# Patient Record
Sex: MALE | Race: WHITE | NOT HISPANIC OR LATINO | Employment: OTHER | URBAN - METROPOLITAN AREA
[De-identification: names, ages, dates, MRNs, and addresses within clinical notes are randomized per-mention and may not be internally consistent; named-entity substitution may affect disease eponyms.]

---

## 2023-05-15 ENCOUNTER — TRANSFERRED RECORDS (OUTPATIENT)
Dept: HEALTH INFORMATION MANAGEMENT | Facility: CLINIC | Age: 63
End: 2023-05-15

## 2023-05-15 LAB
ALT SERPL-CCNC: 26 U/L
AST SERPL-CCNC: 28 U/L (ref 17–59)
CHOLESTEROL (EXTERNAL): 190 MG/DL (ref 0–200)
CREATININE (EXTERNAL): 0.8 MG/DL (ref 0.7–1.4)
GFR ESTIMATED (EXTERNAL): >60 ML/MIN/1.73M2
GLUCOSE (EXTERNAL): 103 MG/DL (ref 60–99)
HBA1C MFR BLD: 5.28 % (ref 4–5.6)
HDLC SERPL-MCNC: 72 MG/DL (ref 35–65)
LDL CHOLESTEROL CALCULATED (EXTERNAL): 86 MG/DL (ref 0–130)
POTASSIUM (EXTERNAL): 4.1 MMOL/L (ref 3.5–5.1)
TRIGLYCERIDES (EXTERNAL): 161 MG/DL (ref 0–200)
TSH SERPL-ACNC: 4.58 MIU/L (ref 0.47–4.68)

## 2023-09-01 ENCOUNTER — TRANSFERRED RECORDS (OUTPATIENT)
Dept: HEALTH INFORMATION MANAGEMENT | Facility: CLINIC | Age: 63
End: 2023-09-01
Payer: COMMERCIAL

## 2023-09-05 ENCOUNTER — MEDICAL CORRESPONDENCE (OUTPATIENT)
Dept: HEALTH INFORMATION MANAGEMENT | Facility: CLINIC | Age: 63
End: 2023-09-05
Payer: COMMERCIAL

## 2023-09-28 ENCOUNTER — HOSPITAL ENCOUNTER (OUTPATIENT)
Dept: CARDIOLOGY | Facility: CLINIC | Age: 63
Discharge: HOME | End: 2023-09-28
Attending: FAMILY MEDICINE | Admitting: FAMILY MEDICINE
Payer: MEDICARE

## 2023-09-28 DIAGNOSIS — R01.1 SYSTOLIC MURMUR: ICD-10-CM

## 2023-09-28 LAB — LVEF ECHO: NORMAL

## 2023-09-28 PROCEDURE — 93306 TTE W/DOPPLER COMPLETE: CPT | Mod: 26 | Performed by: INTERNAL MEDICINE

## 2023-09-28 PROCEDURE — 93306 TTE W/DOPPLER COMPLETE: CPT

## 2023-10-02 ENCOUNTER — MEDICAL CORRESPONDENCE (OUTPATIENT)
Dept: HEALTH INFORMATION MANAGEMENT | Facility: CLINIC | Age: 63
End: 2023-10-02
Payer: COMMERCIAL

## 2023-10-05 ENCOUNTER — PREP FOR PROCEDURE (OUTPATIENT)
Dept: CARDIOLOGY | Facility: CLINIC | Age: 63
End: 2023-10-05

## 2023-10-05 ENCOUNTER — OFFICE VISIT (OUTPATIENT)
Dept: CARDIOLOGY | Facility: CLINIC | Age: 63
End: 2023-10-05
Payer: COMMERCIAL

## 2023-10-05 ENCOUNTER — TELEPHONE (OUTPATIENT)
Dept: CARDIOLOGY | Facility: CLINIC | Age: 63
End: 2023-10-05

## 2023-10-05 VITALS
DIASTOLIC BLOOD PRESSURE: 60 MMHG | RESPIRATION RATE: 16 BRPM | SYSTOLIC BLOOD PRESSURE: 122 MMHG | HEART RATE: 88 BPM | HEIGHT: 73 IN | BODY MASS INDEX: 24.12 KG/M2 | WEIGHT: 182 LBS

## 2023-10-05 DIAGNOSIS — I77.89 AORTIC ROOT ENLARGEMENT (H): ICD-10-CM

## 2023-10-05 DIAGNOSIS — I35.1 NONRHEUMATIC AORTIC VALVE INSUFFICIENCY: Primary | ICD-10-CM

## 2023-10-05 DIAGNOSIS — I10 PRIMARY HYPERTENSION: ICD-10-CM

## 2023-10-05 DIAGNOSIS — I25.84 CORONARY ARTERY CALCIFICATION OF NATIVE ARTERY: ICD-10-CM

## 2023-10-05 DIAGNOSIS — I25.10 CORONARY ARTERY CALCIFICATION OF NATIVE ARTERY: ICD-10-CM

## 2023-10-05 PROCEDURE — 99204 OFFICE O/P NEW MOD 45 MIN: CPT | Performed by: INTERNAL MEDICINE

## 2023-10-05 RX ORDER — AMLODIPINE BESYLATE 10 MG/1
10 TABLET ORAL DAILY
COMMUNITY
Start: 2023-05-15 | End: 2024-04-03

## 2023-10-05 RX ORDER — MULTIVITAMIN,THERAPEUTIC
1 TABLET ORAL DAILY
COMMUNITY

## 2023-10-05 RX ORDER — FENTANYL CITRATE 50 UG/ML
25 INJECTION, SOLUTION INTRAMUSCULAR; INTRAVENOUS
Status: CANCELLED | OUTPATIENT
Start: 2023-10-09

## 2023-10-05 RX ORDER — LIDOCAINE 40 MG/G
CREAM TOPICAL
Status: CANCELLED | OUTPATIENT
Start: 2023-10-05

## 2023-10-05 RX ORDER — SODIUM CHLORIDE 9 MG/ML
INJECTION, SOLUTION INTRAVENOUS CONTINUOUS
Status: CANCELLED | OUTPATIENT
Start: 2023-10-09

## 2023-10-05 NOTE — LETTER
10/5/2023    Sagar Pereira MD  [facility] [address] [place] [id number]    RE: Kory Guerrero       Dear Colleague,     I had the pleasure of seeing Kory Guerrero in the [facility].      Thank you, Dr. Sagar Pereira, for asking the [facility] team to see Mr. Kory Guerrero to evaluate moderate to severe aortic insufficiency.    Assessment/Recommendations   Assessment:    1.  Moderate to severe aortic insufficiency, likely due to moderate aortic root dilatation as a result of years of poorly controlled hypertension in the past.  He does report symptoms of shortness of breath and fatigue which are common for aortic insufficiency.  His ventricle is beginning to dilate and at this point would recommend consideration of valve surgery along with aortic root repair.  Next step would be to pursue coronary angiography to see if he has any concomitant coronary artery disease that would require bypasses.  Would then refer to CV surgery regarding surgical repair or replacement along with aortic root repair.  2.  Essential hypertension, controlled.  No additional medication needed.  3.  Coronary artery calcification.  Patient has been resistant regarding statin therapy.  He does have very high HDL.  We will hold off until the results of his angiogram.  Did talk about beginning baby aspirin although again he would like to hold off.    Plan:  1.  Continue current medications  2.  We will arrange for coronary angiogram and consultation with CV surgery at [facility].       History of Present Illness    Mr. Kory Guerrero is a 63 year old male with history of essential hypertension which had been poorly controlled for a number of years, coronary artery calcification who presented to his primary care physician for routine physical.  At that visit, he was noted to have a prominent heart murmur and thus an echocardiogram was obtained.  This demonstrated mild left  "ventricular dilatation with normal systolic function but evidence of moderate aortic root enlargement and moderate to severe aortic insufficiency.  He is now here for further recommendation.  Has noted increasing symptoms of fatigue over the past 1 to 2 years.  More recently has noted some shortness of breath.  He notes this most prominently when he lays down at night.  Has not had to increase the number of pillows under his head nor has had any PND.  No family history of aortic disease although his maternal grandmother did have valve replacement surgery of unknown type.    ECG (personally reviewed): No ECG today    Cardiac Imaging Studies (personally reviewed):     Procedure  Complete Echo Adult.  ______________________________________________________________________________  Interpretation Summary     1. The left ventricle is mildly dilated. Left ventricular function is  normal.The ejection fraction is 60-65%.  2. Normal right ventricle size and systolic function.  3. The aortic valve is trileaflet. There is moderately severe (3+) aortic  regurgitation. No aortic stenosis is present.  4. There is moderate aortic root enlargement at 45 mm, The ascending aorta is  Borderline dilated at 40 mm.     There is no comparison study available. Hemodynamically significant valve  disease is identified.        Physical Examination Review of Systems   /60 (BP Location: Left arm, Patient Position: Sitting, Cuff Size: Adult Regular)   Pulse 88   Resp 16   Ht 1.854 m (6' 1\")   Wt 82.6 kg (182 lb)   BMI 24.01 kg/m    Body mass index is 24.01 kg/m .  Wt Readings from Last 3 Encounters:   10/05/23 82.6 kg (182 lb)     General Appearance:   Awake, Alert, No acute distress.   HEENT:  No scleral icterus; the mucous membranes were pink and moist.   Neck: No cervical bruits or jugular venous distention    Chest: The spine was straight. The chest was symmetric.   Lungs:   Respirations unlabored; the lungs are clear to " "auscultation. No wheezing   Cardiovascular:   Regular rate and rhythm.  S1, S2 normal.  3/6 diastolic decrescendo murmur heard best at the right upper sternal border radiating to the left upper sternal border and apex.  Soft systolic flow murmur also heard.   Abdomen:  No organomegaly, masses, bruits, or tenderness. Bowels sounds are present   Extremities: No peripheral edema bilaterally.   Skin: No xanthelasma. Warm, Dry.   Musculoskeletal: No tenderness.   Neurologic: Mood and affect are appropriate.    Enc Vitals  BP: 122/60  Pulse: 88  Resp: 16  Weight: 82.6 kg (182 lb)  Height: 185.4 cm (6' 1\")                                         Medical History  Surgical History Family History Social History   Past Medical History:   Diagnosis Date    Aortic root dilatation (H24)     Aortic valve disorder 09/2023    Moderate to severe AI    Hypertension     No past surgical history on file. Family History   Problem Relation Age of Onset    Cerebrovascular Disease Mother 75        ischemic stroke    Cerebrovascular Disease Father 75        hemorrhagic    Valvular heart disease Maternal Grandmother     Social History     Socioeconomic History    Marital status:      Spouse name: Not on file    Number of children: Not on file    Years of education: Not on file    Highest education level: Not on file   Occupational History    Not on file   Tobacco Use    Smoking status: Never    Smokeless tobacco: Never   Vaping Use    Vaping Use: Never used   Substance and Sexual Activity    Alcohol use: Not on file    Drug use: Never    Sexual activity: Not on file   Other Topics Concern    Not on file   Social History Narrative    Not on file     Social Determinants of Health     Financial Resource Strain: Not on file   Food Insecurity: Not on file   Transportation Needs: Not on file   Physical Activity: Not on file   Stress: Not on file   Social Connections: Not on file   Interpersonal Safety: Not on file   Housing Stability: Not on " file          Medications  Allergies   Current Outpatient Medications   Medication Sig Dispense Refill    amLODIPine (NORVASC) 10 MG tablet Take 10 mg by mouth daily      magnesium oxide 200 MG TABS Take 200 mg by mouth daily      multivitamin, therapeutic (THERA-VIT) TABS tablet Take 1 tablet by mouth daily        No Known Allergies      Lab Results    Chemistry/lipid CBC Cardiac Enzymes/BNP/TSH/INR   No results for input(s): TRIG, CHOLHDL, LDL, CREATININE, BUN, NA, CO2 in the last 75584 hours.    Invalid input(s): TOTALCHOL, LDLCALC, K, CL No results for input(s): WBC, HGB, HCT, MCV, PLT in the last 15906 hours. No results for input(s): CKTOTAL, CKMB, TROPONINI, BNP, TSH, INR in the last 56096 hours.    Invalid input(s): CKMBINDEX     A total of 55 minutes was spent reviewing patient's medical records, obtaining history and performing examination, as well as discussing diagnoses/ recommendations with patient and answering all questions.                        Thank you for allowing me to participate in the care of your patient.      Sincerely,     Judy Cook MD     [facility]  cc:   No referring provider defined for this encounter.

## 2023-10-05 NOTE — TELEPHONE ENCOUNTER
----- Message from Margi Saravia sent at 10/5/2023 11:37 AM CDT -----  Regarding: KML ORDERING  Case type: CORONARY ANGIO AND AORTIC ROOT ANGIO    Procedure Physician(s): IRENE/MANI    Procedure Date and Patient Arrival Time: Monday 10/9, with arrival time of 730 AM    H&P: Completed on 10/5 [facility]    Pre-Procedure Lab Appt: TO BE DONE ON ADMISSION     Alerts/Important Info: None    CT SURG CONSULT SCHDULED 10/31 WITH [facility]    ThanksSandy

## 2023-10-05 NOTE — TELEPHONE ENCOUNTER
Kory Guerrero  [address] [place] [address]  [phone] (home)     Procedure cardiologist:  Dr. Houser or Yash  PCP:  Sagar Pereira  H&JASKARAN completed by:  Dr. Cook 10-5-23  Admit date Monday 10-9-23  Arrival time:  0730  Anticoagulation:  NA  CPAP: No  Previous PCI: No  Bypass Grafts: No  Renal Issues: No  Diabetic?: No  Device?: No  Type:  NA  Ambulation status: independent    Reason for Visit:  Telephone call to discuss pre-procedure education in preparation for: coronary and aortic root  angiogram.    Procedure Prep:  Primary Cardiologist note dated: 10-5-23  EKG results obtained, dated: To be completed on day of cath lab procedure  Hemogram results obtained: To be completed on day of cath lab procedure  Basic Metabolic Panel results obtained: To be completed on day of cath lab procedure  Lipid Profile results obtained: 5-15-23  Pertinent cardiac test results: 5-18-22 CT Ca scan  Orders placed per cosign required protocol 10-5-23.    COVID-19 test: NA    Patient Education  Your arrival time is 0730.  [place] [address] - Main Entrance of the Hospital  Please plan on being at the hospital all day.  At any time, emergencies and/or urgent cases may come up which could delay the start of your procedure.    COVID Testing Instructions  *Mandatory COVID Testing: ALL Patients will need to complete a COVID test no sooner than 4 days prior to their procedure (regardless of vaccination status).    To schedule COVID testing Please call [phone]  If you want to complete this at an outside facility please call them to find out if they will have the results within the appropriate time frame and their fax number.  You will need to provide us with that information so we can send the order.  The facility completing the test will need to fax the results to [phone]  If you are running into and issues please call us.     Pre-procedure  instructions  Patient instructed to not Eat or Drink after midnight.  Patient instructed to shower the evening before or the morning of the procedure.  Patient instructed to arrange for transportation home following procedure from a responsible family member of friend (wife Eboni will be ).  No driving for at least 24 hours.  Patient instructed to have a responsible adult with them for 24 hours post-procedure.  Post-procedure follow up process.  Conscious sedation discussed.  Patient instructed on antiplatelet medication.  Received procedure education handouts via email.    Pre-procedure medication instructions.  Continue medications as scheduled, with a small amount of water on the day of the procedure unless indicated. (NO Diabetic Medications or Blood Thinners)  Pt instructed not to consume Alcohol, Tobacco, Caffeine 12 hours prior to procedure.  Patient instructed to take Amlodipine morning of procedure.    Patient states understanding of procedure and agrees to proceed.    *PATIENTS RECORDS AVAILABLE IN [employer] UNLESS OTHERWISE INDICATED*      There is no problem list on file for this patient.      Current Outpatient Medications   Medication Sig Dispense Refill    amLODIPine (NORVASC) 10 MG tablet Take 10 mg by mouth daily      magnesium oxide 200 MG TABS Take 200 mg by mouth daily      multivitamin, therapeutic (THERA-VIT) TABS tablet Take 1 tablet by mouth daily         No Known Allergies    Estefanía Green RN on 10/5/2023 at 1:57 PM

## 2023-10-05 NOTE — PATIENT INSTRUCTIONS
Continue current medications  Set up coronary angiogram in anticipation of aortic valve replacement.

## 2023-10-05 NOTE — H&P (VIEW-ONLY)
Thank you, Dr. Sagar Pereira, for asking the [facility] team to see Mr. Kory Guerrero to evaluate moderate to severe aortic insufficiency.    Assessment/Recommendations   Assessment:    1.  Moderate to severe aortic insufficiency, likely due to moderate aortic root dilatation as a result of years of poorly controlled hypertension in the past.  He does report symptoms of shortness of breath and fatigue which are common for aortic insufficiency.  His ventricle is beginning to dilate and at this point would recommend consideration of valve surgery along with aortic root repair.  Next step would be to pursue coronary angiography to see if he has any concomitant coronary artery disease that would require bypasses.  Would then refer to CV surgery regarding surgical repair or replacement along with aortic root repair.  2.  Essential hypertension, controlled.  No additional medication needed.  3.  Coronary artery calcification.  Patient has been resistant regarding statin therapy.  He does have very high HDL.  We will hold off until the results of his angiogram.  Did talk about beginning baby aspirin although again he would like to hold off.    Plan:  1.  Continue current medications  2.  We will arrange for coronary angiogram and consultation with CV surgery at [facility].       History of Present Illness    Mr. Kory Guerrero is a 63 year old male with history of essential hypertension which had been poorly controlled for a number of years, coronary artery calcification who presented to his primary care physician for routine physical.  At that visit, he was noted to have a prominent heart murmur and thus an echocardiogram was obtained.  This demonstrated mild left ventricular dilatation with normal systolic function but evidence of moderate aortic root enlargement and moderate to severe aortic insufficiency.  He is now here for further recommendation.  Has noted increasing symptoms of  "fatigue over the past 1 to 2 years.  More recently has noted some shortness of breath.  He notes this most prominently when he lays down at night.  Has not had to increase the number of pillows under his head nor has had any PND.  No family history of aortic disease although his maternal grandmother did have valve replacement surgery of unknown type.    ECG (personally reviewed): No ECG today    Cardiac Imaging Studies (personally reviewed):     Procedure  Complete Echo Adult.  ______________________________________________________________________________  Interpretation Summary     1. The left ventricle is mildly dilated. Left ventricular function is  normal.The ejection fraction is 60-65%.  2. Normal right ventricle size and systolic function.  3. The aortic valve is trileaflet. There is moderately severe (3+) aortic  regurgitation. No aortic stenosis is present.  4. There is moderate aortic root enlargement at 45 mm, The ascending aorta is  Borderline dilated at 40 mm.     There is no comparison study available. Hemodynamically significant valve  disease is identified.        Physical Examination Review of Systems   /60 (BP Location: Left arm, Patient Position: Sitting, Cuff Size: Adult Regular)   Pulse 88   Resp 16   Ht 1.854 m (6' 1\")   Wt 82.6 kg (182 lb)   BMI 24.01 kg/m    Body mass index is 24.01 kg/m .  Wt Readings from Last 3 Encounters:   10/05/23 82.6 kg (182 lb)     General Appearance:   Awake, Alert, No acute distress.   HEENT:  No scleral icterus; the mucous membranes were pink and moist.   Neck: No cervical bruits or jugular venous distention    Chest: The spine was straight. The chest was symmetric.   Lungs:   Respirations unlabored; the lungs are clear to auscultation. No wheezing   Cardiovascular:   Regular rate and rhythm.  S1, S2 normal.  3/6 diastolic decrescendo murmur heard best at the right upper sternal border radiating to the left upper sternal border and apex.  Soft systolic " "flow murmur also heard.   Abdomen:  No organomegaly, masses, bruits, or tenderness. Bowels sounds are present   Extremities: No peripheral edema bilaterally.   Skin: No xanthelasma. Warm, Dry.   Musculoskeletal: No tenderness.   Neurologic: Mood and affect are appropriate.    Enc Vitals  BP: 122/60  Pulse: 88  Resp: 16  Weight: 82.6 kg (182 lb)  Height: 185.4 cm (6' 1\")                                         Medical History  Surgical History Family History Social History   Past Medical History:   Diagnosis Date    Aortic root dilatation (H24)     Aortic valve disorder 09/2023    Moderate to severe AI    Hypertension     No past surgical history on file. Family History   Problem Relation Age of Onset    Cerebrovascular Disease Mother 75        ischemic stroke    Cerebrovascular Disease Father 75        hemorrhagic    Valvular heart disease Maternal Grandmother     Social History     Socioeconomic History    Marital status:      Spouse name: Not on file    Number of children: Not on file    Years of education: Not on file    Highest education level: Not on file   Occupational History    Not on file   Tobacco Use    Smoking status: Never    Smokeless tobacco: Never   Vaping Use    Vaping Use: Never used   Substance and Sexual Activity    Alcohol use: Not on file    Drug use: Never    Sexual activity: Not on file   Other Topics Concern    Not on file   Social History Narrative    Not on file     Social Determinants of Health     Financial Resource Strain: Not on file   Food Insecurity: Not on file   Transportation Needs: Not on file   Physical Activity: Not on file   Stress: Not on file   Social Connections: Not on file   Interpersonal Safety: Not on file   Housing Stability: Not on file          Medications  Allergies   Current Outpatient Medications   Medication Sig Dispense Refill    amLODIPine (NORVASC) 10 MG tablet Take 10 mg by mouth daily      magnesium oxide 200 MG TABS Take 200 mg by mouth daily      " multivitamin, therapeutic (THERA-VIT) TABS tablet Take 1 tablet by mouth daily        No Known Allergies      Lab Results    Chemistry/lipid CBC Cardiac Enzymes/BNP/TSH/INR   No results for input(s): TRIG, CHOLHDL, LDL, CREATININE, BUN, NA, CO2 in the last 59439 hours.    Invalid input(s): TOTALCHOL, LDLCALC, K, CL No results for input(s): WBC, HGB, HCT, MCV, PLT in the last 71391 hours. No results for input(s): CKTOTAL, CKMB, TROPONINI, BNP, TSH, INR in the last 58012 hours.    Invalid input(s): CKMBINDEX     A total of 55 minutes was spent reviewing patient's medical records, obtaining history and performing examination, as well as discussing diagnoses/ recommendations with patient and answering all questions.

## 2023-10-05 NOTE — PROGRESS NOTES
Thank you, Dr. Sagar Pereira, for asking the [facility] team to see Mr. Kory Guerrero to evaluate moderate to severe aortic insufficiency.    Assessment/Recommendations   Assessment:    1.  Moderate to severe aortic insufficiency, likely due to moderate aortic root dilatation as a result of years of poorly controlled hypertension in the past.  He does report symptoms of shortness of breath and fatigue which are common for aortic insufficiency.  His ventricle is beginning to dilate and at this point would recommend consideration of valve surgery along with aortic root repair.  Next step would be to pursue coronary angiography to see if he has any concomitant coronary artery disease that would require bypasses.  Would then refer to CV surgery regarding surgical repair or replacement along with aortic root repair.  2.  Essential hypertension, controlled.  No additional medication needed.  3.  Coronary artery calcification.  Patient has been resistant regarding statin therapy.  He does have very high HDL.  We will hold off until the results of his angiogram.  Did talk about beginning baby aspirin although again he would like to hold off.    Plan:  1.  Continue current medications  2.  We will arrange for coronary angiogram and consultation with CV surgery at [facility].       History of Present Illness    Mr. Kory Guerrero is a 63 year old male with history of essential hypertension which had been poorly controlled for a number of years, coronary artery calcification who presented to his primary care physician for routine physical.  At that visit, he was noted to have a prominent heart murmur and thus an echocardiogram was obtained.  This demonstrated mild left ventricular dilatation with normal systolic function but evidence of moderate aortic root enlargement and moderate to severe aortic insufficiency.  He is now here for further recommendation.  Has noted increasing symptoms of  "fatigue over the past 1 to 2 years.  More recently has noted some shortness of breath.  He notes this most prominently when he lays down at night.  Has not had to increase the number of pillows under his head nor has had any PND.  No family history of aortic disease although his maternal grandmother did have valve replacement surgery of unknown type.    ECG (personally reviewed): No ECG today    Cardiac Imaging Studies (personally reviewed):     Procedure  Complete Echo Adult.  ______________________________________________________________________________  Interpretation Summary     1. The left ventricle is mildly dilated. Left ventricular function is  normal.The ejection fraction is 60-65%.  2. Normal right ventricle size and systolic function.  3. The aortic valve is trileaflet. There is moderately severe (3+) aortic  regurgitation. No aortic stenosis is present.  4. There is moderate aortic root enlargement at 45 mm, The ascending aorta is  Borderline dilated at 40 mm.     There is no comparison study available. Hemodynamically significant valve  disease is identified.        Physical Examination Review of Systems   /60 (BP Location: Left arm, Patient Position: Sitting, Cuff Size: Adult Regular)   Pulse 88   Resp 16   Ht 1.854 m (6' 1\")   Wt 82.6 kg (182 lb)   BMI 24.01 kg/m    Body mass index is 24.01 kg/m .  Wt Readings from Last 3 Encounters:   10/05/23 82.6 kg (182 lb)     General Appearance:   Awake, Alert, No acute distress.   HEENT:  No scleral icterus; the mucous membranes were pink and moist.   Neck: No cervical bruits or jugular venous distention    Chest: The spine was straight. The chest was symmetric.   Lungs:   Respirations unlabored; the lungs are clear to auscultation. No wheezing   Cardiovascular:   Regular rate and rhythm.  S1, S2 normal.  3/6 diastolic decrescendo murmur heard best at the right upper sternal border radiating to the left upper sternal border and apex.  Soft systolic " "flow murmur also heard.   Abdomen:  No organomegaly, masses, bruits, or tenderness. Bowels sounds are present   Extremities: No peripheral edema bilaterally.   Skin: No xanthelasma. Warm, Dry.   Musculoskeletal: No tenderness.   Neurologic: Mood and affect are appropriate.    Enc Vitals  BP: 122/60  Pulse: 88  Resp: 16  Weight: 82.6 kg (182 lb)  Height: 185.4 cm (6' 1\")                                         Medical History  Surgical History Family History Social History   Past Medical History:   Diagnosis Date    Aortic root dilatation (H24)     Aortic valve disorder 09/2023    Moderate to severe AI    Hypertension     No past surgical history on file. Family History   Problem Relation Age of Onset    Cerebrovascular Disease Mother 75        ischemic stroke    Cerebrovascular Disease Father 75        hemorrhagic    Valvular heart disease Maternal Grandmother     Social History     Socioeconomic History    Marital status:      Spouse name: Not on file    Number of children: Not on file    Years of education: Not on file    Highest education level: Not on file   Occupational History    Not on file   Tobacco Use    Smoking status: Never    Smokeless tobacco: Never   Vaping Use    Vaping Use: Never used   Substance and Sexual Activity    Alcohol use: Not on file    Drug use: Never    Sexual activity: Not on file   Other Topics Concern    Not on file   Social History Narrative    Not on file     Social Determinants of Health     Financial Resource Strain: Not on file   Food Insecurity: Not on file   Transportation Needs: Not on file   Physical Activity: Not on file   Stress: Not on file   Social Connections: Not on file   Interpersonal Safety: Not on file   Housing Stability: Not on file          Medications  Allergies   Current Outpatient Medications   Medication Sig Dispense Refill    amLODIPine (NORVASC) 10 MG tablet Take 10 mg by mouth daily      magnesium oxide 200 MG TABS Take 200 mg by mouth daily      " multivitamin, therapeutic (THERA-VIT) TABS tablet Take 1 tablet by mouth daily        No Known Allergies      Lab Results    Chemistry/lipid CBC Cardiac Enzymes/BNP/TSH/INR   No results for input(s): TRIG, CHOLHDL, LDL, CREATININE, BUN, NA, CO2 in the last 23987 hours.    Invalid input(s): TOTALCHOL, LDLCALC, K, CL No results for input(s): WBC, HGB, HCT, MCV, PLT in the last 84977 hours. No results for input(s): CKTOTAL, CKMB, TROPONINI, BNP, TSH, INR in the last 09006 hours.    Invalid input(s): CKMBINDEX     A total of 55 minutes was spent reviewing patient's medical records, obtaining history and performing examination, as well as discussing diagnoses/ recommendations with patient and answering all questions.

## 2023-10-09 ENCOUNTER — HOSPITAL ENCOUNTER (OUTPATIENT)
Facility: HOSPITAL | Age: 63
Discharge: HOME | End: 2023-10-09
Attending: INTERNAL MEDICINE | Admitting: INTERNAL MEDICINE
Payer: MEDICARE

## 2023-10-09 VITALS
TEMPERATURE: 97.6 F | SYSTOLIC BLOOD PRESSURE: 126 MMHG | HEART RATE: 69 BPM | DIASTOLIC BLOOD PRESSURE: 71 MMHG | WEIGHT: 182 LBS | OXYGEN SATURATION: 96 % | RESPIRATION RATE: 23 BRPM | BODY MASS INDEX: 24.12 KG/M2 | HEIGHT: 73 IN

## 2023-10-09 DIAGNOSIS — I77.89 AORTIC ROOT ENLARGEMENT (H): ICD-10-CM

## 2023-10-09 DIAGNOSIS — I35.1 NONRHEUMATIC AORTIC VALVE INSUFFICIENCY: ICD-10-CM

## 2023-10-09 PROBLEM — Z98.890 STATUS POST CORONARY ANGIOGRAM: Status: ACTIVE | Noted: 2023-10-09

## 2023-10-09 LAB
ABO/RH(D): NORMAL
ANION GAP SERPL CALCULATED.3IONS-SCNC: 11 MMOL/L (ref 7–15)
ANTIBODY SCREEN: NEGATIVE
ATRIAL RATE - MUSE: 65 BPM
BUN SERPL-MCNC: 13.2 MG/DL (ref 8–23)
CALCIUM SERPL-MCNC: 8.7 MG/DL (ref 8.8–10.2)
CHLORIDE SERPL-SCNC: 104 MMOL/L (ref 98–107)
CREAT SERPL-MCNC: 0.86 MG/DL (ref 0.67–1.17)
DEPRECATED HCO3 PLAS-SCNC: 25 MMOL/L (ref 22–29)
DIASTOLIC BLOOD PRESSURE - MUSE: NORMAL MMHG
EGFRCR SERPLBLD CKD-EPI 2021: >90 ML/MIN/1.73M2
ERYTHROCYTE [DISTWIDTH] IN BLOOD BY AUTOMATED COUNT: 12.9 % (ref 10–15)
GLUCOSE SERPL-MCNC: 116 MG/DL (ref 70–99)
HCT VFR BLD AUTO: 42.2 % (ref 40–53)
HGB BLD-MCNC: 14.9 G/DL (ref 13.3–17.7)
INTERPRETATION ECG - MUSE: NORMAL
MCH RBC QN AUTO: 31.6 PG (ref 26.5–33)
MCHC RBC AUTO-ENTMCNC: 35.3 G/DL (ref 31.5–36.5)
MCV RBC AUTO: 89 FL (ref 78–100)
P AXIS - MUSE: 37 DEGREES
PLATELET # BLD AUTO: 176 10E3/UL (ref 150–450)
POTASSIUM SERPL-SCNC: 3.6 MMOL/L (ref 3.4–5.3)
PR INTERVAL - MUSE: 184 MS
QRS DURATION - MUSE: 98 MS
QT - MUSE: 398 MS
QTC - MUSE: 413 MS
R AXIS - MUSE: -17 DEGREES
RBC # BLD AUTO: 4.72 10E6/UL (ref 4.4–5.9)
SODIUM SERPL-SCNC: 140 MMOL/L (ref 135–145)
SPECIMEN EXPIRATION DATE: NORMAL
SYSTOLIC BLOOD PRESSURE - MUSE: NORMAL MMHG
T AXIS - MUSE: 0 DEGREES
VENTRICULAR RATE- MUSE: 65 BPM
WBC # BLD AUTO: 4.3 10E3/UL (ref 4–11)

## 2023-10-09 PROCEDURE — 250N000009 HC RX 250: Performed by: INTERNAL MEDICINE

## 2023-10-09 PROCEDURE — 93567 NJX CAR CTH SPRVLV AORTGRPHY: CPT | Performed by: INTERNAL MEDICINE

## 2023-10-09 PROCEDURE — 93458 L HRT ARTERY/VENTRICLE ANGIO: CPT | Mod: 26 | Performed by: INTERNAL MEDICINE

## 2023-10-09 PROCEDURE — 93458 L HRT ARTERY/VENTRICLE ANGIO: CPT | Performed by: INTERNAL MEDICINE

## 2023-10-09 PROCEDURE — C1887 CATHETER, GUIDING: HCPCS | Performed by: INTERNAL MEDICINE

## 2023-10-09 PROCEDURE — 258N000003 HC RX IP 258 OP 636: Performed by: INTERNAL MEDICINE

## 2023-10-09 PROCEDURE — 250N000013 HC RX MED GY IP 250 OP 250 PS 637: Performed by: NURSE PRACTITIONER

## 2023-10-09 PROCEDURE — 99152 MOD SED SAME PHYS/QHP 5/>YRS: CPT | Performed by: INTERNAL MEDICINE

## 2023-10-09 PROCEDURE — 93460 R&L HRT ART/VENTRICLE ANGIO: CPT | Performed by: INTERNAL MEDICINE

## 2023-10-09 PROCEDURE — 82310 ASSAY OF CALCIUM: CPT | Performed by: INTERNAL MEDICINE

## 2023-10-09 PROCEDURE — 250N000011 HC RX IP 250 OP 636: Performed by: INTERNAL MEDICINE

## 2023-10-09 PROCEDURE — 86850 RBC ANTIBODY SCREEN: CPT | Performed by: INTERNAL MEDICINE

## 2023-10-09 PROCEDURE — 255N000002 HC RX 255 OP 636: Performed by: INTERNAL MEDICINE

## 2023-10-09 PROCEDURE — 85027 COMPLETE CBC AUTOMATED: CPT | Performed by: INTERNAL MEDICINE

## 2023-10-09 PROCEDURE — 75605 CONTRAST EXAM THORACIC AORTA: CPT | Mod: 26 | Performed by: INTERNAL MEDICINE

## 2023-10-09 PROCEDURE — 36415 COLL VENOUS BLD VENIPUNCTURE: CPT | Performed by: INTERNAL MEDICINE

## 2023-10-09 PROCEDURE — 272N000001 HC OR GENERAL SUPPLY STERILE: Performed by: INTERNAL MEDICINE

## 2023-10-09 PROCEDURE — 999N000054 HC STATISTIC EKG NON-CHARGEABLE

## 2023-10-09 PROCEDURE — C1769 GUIDE WIRE: HCPCS | Performed by: INTERNAL MEDICINE

## 2023-10-09 PROCEDURE — 99153 MOD SED SAME PHYS/QHP EA: CPT | Performed by: INTERNAL MEDICINE

## 2023-10-09 PROCEDURE — 93010 ELECTROCARDIOGRAM REPORT: CPT | Performed by: INTERNAL MEDICINE

## 2023-10-09 PROCEDURE — C1894 INTRO/SHEATH, NON-LASER: HCPCS | Performed by: INTERNAL MEDICINE

## 2023-10-09 PROCEDURE — 75605 CONTRAST EXAM THORACIC AORTA: CPT | Performed by: INTERNAL MEDICINE

## 2023-10-09 PROCEDURE — 86901 BLOOD TYPING SEROLOGIC RH(D): CPT | Performed by: INTERNAL MEDICINE

## 2023-10-09 PROCEDURE — 93005 ELECTROCARDIOGRAM TRACING: CPT

## 2023-10-09 RX ORDER — NALOXONE HYDROCHLORIDE 0.4 MG/ML
0.2 INJECTION, SOLUTION INTRAMUSCULAR; INTRAVENOUS; SUBCUTANEOUS
Status: DISCONTINUED | OUTPATIENT
Start: 2023-10-09 | End: 2023-10-09 | Stop reason: HOSPADM

## 2023-10-09 RX ORDER — OXYCODONE HYDROCHLORIDE 5 MG/1
5 TABLET ORAL EVERY 4 HOURS PRN
Status: DISCONTINUED | OUTPATIENT
Start: 2023-10-09 | End: 2023-10-09 | Stop reason: HOSPADM

## 2023-10-09 RX ORDER — LIDOCAINE 40 MG/G
CREAM TOPICAL
Status: DISCONTINUED | OUTPATIENT
Start: 2023-10-09 | End: 2023-10-09 | Stop reason: HOSPADM

## 2023-10-09 RX ORDER — ACETAMINOPHEN 325 MG/1
650 TABLET ORAL EVERY 4 HOURS PRN
Status: DISCONTINUED | OUTPATIENT
Start: 2023-10-09 | End: 2023-10-09 | Stop reason: HOSPADM

## 2023-10-09 RX ORDER — ATROPINE SULFATE 0.1 MG/ML
0.5 INJECTION INTRAVENOUS
Status: DISCONTINUED | OUTPATIENT
Start: 2023-10-09 | End: 2023-10-09 | Stop reason: HOSPADM

## 2023-10-09 RX ORDER — OXYCODONE HYDROCHLORIDE 5 MG/1
10 TABLET ORAL EVERY 4 HOURS PRN
Status: DISCONTINUED | OUTPATIENT
Start: 2023-10-09 | End: 2023-10-09 | Stop reason: HOSPADM

## 2023-10-09 RX ORDER — FLUMAZENIL 0.1 MG/ML
0.2 INJECTION, SOLUTION INTRAVENOUS
Status: DISCONTINUED | OUTPATIENT
Start: 2023-10-09 | End: 2023-10-09 | Stop reason: HOSPADM

## 2023-10-09 RX ORDER — FENTANYL CITRATE 50 UG/ML
25 INJECTION, SOLUTION INTRAMUSCULAR; INTRAVENOUS
Status: DISCONTINUED | OUTPATIENT
Start: 2023-10-09 | End: 2023-10-09 | Stop reason: HOSPADM

## 2023-10-09 RX ORDER — SODIUM CHLORIDE 9 MG/ML
INJECTION, SOLUTION INTRAVENOUS CONTINUOUS
Status: DISCONTINUED | OUTPATIENT
Start: 2023-10-09 | End: 2023-10-09 | Stop reason: HOSPADM

## 2023-10-09 RX ORDER — DIAZEPAM 5 MG
5 TABLET ORAL ONCE
Status: COMPLETED | OUTPATIENT
Start: 2023-10-09 | End: 2023-10-09

## 2023-10-09 RX ORDER — NALOXONE HYDROCHLORIDE 0.4 MG/ML
0.4 INJECTION, SOLUTION INTRAMUSCULAR; INTRAVENOUS; SUBCUTANEOUS
Status: DISCONTINUED | OUTPATIENT
Start: 2023-10-09 | End: 2023-10-09 | Stop reason: HOSPADM

## 2023-10-09 RX ORDER — ASPIRIN 325 MG
325 TABLET ORAL ONCE
Status: COMPLETED | OUTPATIENT
Start: 2023-10-09 | End: 2023-10-09

## 2023-10-09 RX ORDER — FENTANYL CITRATE 50 UG/ML
INJECTION, SOLUTION INTRAMUSCULAR; INTRAVENOUS
Status: DISCONTINUED | OUTPATIENT
Start: 2023-10-09 | End: 2023-10-09 | Stop reason: HOSPADM

## 2023-10-09 RX ORDER — IODIXANOL 320 MG/ML
INJECTION, SOLUTION INTRAVASCULAR
Status: DISCONTINUED | OUTPATIENT
Start: 2023-10-09 | End: 2023-10-09 | Stop reason: HOSPADM

## 2023-10-09 RX ADMIN — DIAZEPAM 5 MG: 5 TABLET ORAL at 08:03

## 2023-10-09 RX ADMIN — ASPIRIN 325 MG ORAL TABLET 325 MG: 325 PILL ORAL at 08:02

## 2023-10-09 RX ADMIN — SODIUM CHLORIDE: 9 INJECTION, SOLUTION INTRAVENOUS at 07:58

## 2023-10-09 ASSESSMENT — ACTIVITIES OF DAILY LIVING (ADL)
ADLS_ACUITY_SCORE: 35
ADLS_ACUITY_SCORE: 35

## 2023-10-09 ASSESSMENT — EJECTION FRACTION: EF_VALUE: .16

## 2023-10-09 NOTE — PRE-PROCEDURE
GENERAL PRE-PROCEDURE:   Procedure:  Coronary angiogram, aortic root angiogram  Date/Time:  10/9/2023 7:53 AM    Written consent obtained?: Yes    Risks and benefits: Risks, benefits and alternatives were discussed    Consent given by:  Patient  Patient states understanding of procedure being performed: Yes    Patient's understanding of procedure matches consent: Yes    Procedure consent matches procedure scheduled: Yes    Expected level of sedation:  Moderate  Appropriately NPO:  Yes  ASA Class:  4 (aortic root dilation, mod-severe aortic insuffiency, HTN, coronary artery calcification)  Mallampati  :  Grade 2- soft palate, base of uvula, tonsillar pillars, and portion of posterior pharyngeal wall visible  Lungs:  Lungs clear with good breath sounds bilaterally  Heart:  Diastolic murmur  History & Physical reviewed:  History and physical reviewed and updates made (see comment)  H&P Comments:  Clinically Significant Risk Factors Present on Admission    Cardiovascular: Non-Rheumatic Valve Disease: Aortic valve insufficiency, Aortic root dilation    Fluid & Electrolyte Disorders : Not present on admission    Gastroenterology : Not present on admission    Hematology/Oncology : Not present on admission    Nephrology : Not present on admission    Neurology : Not present on admission    Pulmonology : Not present on admission    Systemic : Not present on admission  Statement of review:  I have reviewed the lab findings, diagnostic data, medications, and the plan for sedation

## 2023-10-09 NOTE — DISCHARGE INSTRUCTIONS

## 2023-10-09 NOTE — PLAN OF CARE
Goal Outcome Evaluation:         Pt returned from procedure VSS. TR band removed per protocol.   Band aide applied, dry and intact. Pt ambulated in [facility]pson and to bathroom.     Discharge instructions given to pt and wife. Pt discharged home with wife.    Tara Jones RN

## 2023-10-09 NOTE — PLAN OF CARE
Goal Outcome Evaluation:         Pt admitted for coronary angiogram due to is aortic insufficiency. Pt prepped and ready for procedure. Wife Eboni is here for support.      Tara Jones RN

## 2023-10-27 ENCOUNTER — TELEPHONE (OUTPATIENT)
Dept: CARDIOLOGY | Facility: CLINIC | Age: 63
End: 2023-10-27
Payer: COMMERCIAL

## 2023-10-31 ENCOUNTER — TELEPHONE (OUTPATIENT)
Dept: CARDIOLOGY | Facility: CLINIC | Age: 63
End: 2023-10-31

## 2023-10-31 ENCOUNTER — OFFICE VISIT (OUTPATIENT)
Dept: CARDIOLOGY | Facility: CLINIC | Age: 63
End: 2023-10-31
Payer: COMMERCIAL

## 2023-10-31 VITALS
BODY MASS INDEX: 24.12 KG/M2 | SYSTOLIC BLOOD PRESSURE: 134 MMHG | RESPIRATION RATE: 16 BRPM | WEIGHT: 182 LBS | HEIGHT: 73 IN | DIASTOLIC BLOOD PRESSURE: 68 MMHG | HEART RATE: 75 BPM

## 2023-10-31 DIAGNOSIS — I35.1 NONRHEUMATIC AORTIC VALVE INSUFFICIENCY: Primary | ICD-10-CM

## 2023-10-31 DIAGNOSIS — I77.89 AORTIC ROOT ENLARGEMENT (H): ICD-10-CM

## 2023-10-31 PROCEDURE — 99205 OFFICE O/P NEW HI 60 MIN: CPT | Performed by: SURGERY

## 2023-10-31 NOTE — PROGRESS NOTES
Cardiac and Thoracic Surgery Consultation      Kory Guerrero MRN# [id number]   YOB: 1960 Age: 63 year old        Reason for consult: I was asked by Dr. Judy Cook to evaluate this patient for severe aortic regurgitation and moderate aortic root dilation.           Assessment and Plan:   Mr. 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 Coronary angiography demonstrates no coronary artery disease. I recommend aortic valve replacement. Because he also has aortic root enlargement, replacement of the aortic root, either with valve sparing root replacement versus aortic root replacement, could be considered, although the size of the aortic root measured on echocardiography does not meet the cutoff of 5 cm for ACC/AHA guidelines (assuming he does not have a genetic syndrome). I discussed the option of genetic testing with him, and I also discussed the options of aortic valve replacement and continued surveillance of the aortic root versus aortic root replacement outside of current guidelines. He understands that if he has aortic valve replacement and we leave the root, there is a small chance that he could need redo sternotomy and aortic root replacement later. He is going to consider all this and we will make a shared decision understanding the risks and benefits of both approaches. I described the technical details, as well as the expected postoperative course and recovery to the patient. I also discussed the risks and benefits of the procedure. The risks include but are not limited to bleeding, infection, stroke, heart failure, dysrhythmia, respiratory failure, kidney or liver injury, bowel or limb ischemia, and death. The estimated risk of mortality is 2%, and the estimated risk of major morbidity is 10-15%. The patient understands these risks and wishes to undergo the operation.     I discussed the option of a bioprosthetic versus a mechanical  valve with the patient. The patient understands that a bioprosthetic valve would not require lifelong anticoagulation, but there is a risk of prosthetic valve degeneration. I also explained that while a mechanical valve has unlimited durability, this would require lifelong anticoagulation with Coumadin. There is also a small risk of hearing the valve click. The risk of infection is equal between the two.    Surgery will be scheduled in the coming months.    After dental clearance, the preoperative evaluation will be complete.              Chief Complaint:   Mr. 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    History is obtained from the patient         History of Present Illness:   This patient is a 63 year old male who presents to discuss treatment options of his severe aortic regurgitation and aortic root aneurysm. He has noted decreased exercise tolerance and decreased energy. He also has some difficulty like chest tightness when he lies down recently, although I am not sure if this is paroxysmal nocturnal dyspnea. Also, he sometimes uses 2 pillows concerning for orthopnea. He denies lower extremity edema, and he can actually walk several miles without too much difficulty. He hunts birds with his dog and he does not have trouble walking on uneven ground. Overall, it seems like his symptoms are progressing.                Past Medical History:     Past Medical History:   Diagnosis Date    Aortic root dilatation (H24)     Aortic valve disorder 09/2023    Moderate to severe AI    Hypertension              Past Surgical History:     Past Surgical History:   Procedure Laterality Date    CV AO ROOT N/A 10/9/2023    Procedure: Cardiac Aortic Root;  Surgeon: Edis Berrios MD;  Location: [facility] CV    CV CORONARY ANGIOGRAM N/A 10/9/2023    Procedure: Coronary Angiogram;  Surgeon: Edis Berrios MD;  Location: [facility]    CV LEFT HEART  CATH N/A 10/9/2023    Procedure: Left Heart Catheterization;  Surgeon: Edis Berrios MD;  Location: [facility] CV    TONSILLECTOMY, ADENOIDECTOMY, COMBINED                 Social History:     Social History     Tobacco Use    Smoking status: Never    Smokeless tobacco: Never   Substance Use Topics    Alcohol use: Yes     Comment: occasional             Family History:     Family History   Problem Relation Age of Onset    Cerebrovascular Disease Mother 75        ischemic stroke    Cerebrovascular Disease Father 75        hemorrhagic    Valvular heart disease Maternal Grandmother              Immunizations:     Immunization History   Administered Date(s) Administered    COVID-19 Monovalent 18+ (Moderna) 04/17/2021, 05/15/2021, 01/05/2022             Allergies:   No Known Allergies          Medications:     Current Outpatient Medications Ordered in Epic   Medication    amLODIPine (NORVASC) 10 MG tablet    magnesium oxide 200 MG TABS    multivitamin, therapeutic (THERA-VIT) TABS tablet     No current Epic-ordered facility-administered medications on file.             Review of Systems:     The 10 point Review of Systems is negative other than noted in the HPI            Physical Exam:   Vitals were reviewed      BP: 134/68 Pulse: 75   Resp: 16        Constitutional:   awake, alert, cooperative, no apparent distress, and appears stated age     Eyes:   lids and lashes normal, sclera clear, and conjunctiva normal     ENT:   normocepalic, without obvious abnormality     Neck:   supple, symmetrical, trachea midline     Lungs:   no increased work of breathing, good air exchange, and no retractions     Cardiovascular:   regular rate and rhythm     Abdomen:   non-distended     Musculoskeletal:   no lower extremity pitting edema present  there is no redness, warmth, or swelling of the joints  full range of motion noted  motor strength is 5 out of 5 all extremities bilaterally     Neurologic:   Mental Status Exam:   Level of Alertness:   awake  Orientation:   person, place, time  Cranial Nerves:  cranial nerves II-XII are grossly intact  Motor Exam:  moves all extremities well and symmetrically     Neuropsychiatric:   General: normal, calm, and normal eye contact  Level of consciousness: alert / normal  Affect: normal     Skin:   no bruising or bleeding, normal skin color, texture, turgor, and no redness, warmth, or swelling          Data:   All laboratory data reviewed  All cardiac studies reviewed by me.  All imaging studies reviewed by me.    CARDIAC CATHETERIZATION:  Left Main   The vessel was visualized by selective angiography and is moderate in size. There was 0% vessel disease.      Left Anterior Descending   The vessel was visualized by selective angiography and is moderate in size. There was 0% vessel disease.      Left Circumflex   The vessel was visualized by selective angiography and is moderate in size. There was 0% vessel disease.      Right Coronary Artery   The vessel was visualized by selective angiography and is moderate in size. There was 0% vessel disease.          TRANSTHORACIC ECHOCARDIOGRAPHY:  Interpretation Summary     1. The left ventricle is mildly dilated. Left ventricular function is  normal.The ejection fraction is 60-65%.  2. Normal right ventricle size and systolic function.  3. The aortic valve is trileaflet. There is moderately severe (3+) aortic  regurgitation. No aortic stenosis is present.  4. There is moderate aortic root enlargement at 45 mm, The ascending aorta is  Borderline dilated at 40 mm.       Left Ventricle  Left ventricular function is normal.The ejection fraction is 60-65%. The left  ventricle is mildly dilated. There is normal left ventricular wall thickness.  Left ventricular diastolic function is normal. No regional wall motion  abnormalities noted.     Right Ventricle  Normal right ventricle size and systolic function.     Atria  Normal left atrial size. Right atrial size is  normal. There is no color  Doppler evidence of an atrial shunt.     Mitral Valve  Mitral valve leaflets appear normal. There is no evidence of mitral stenosis  or clinically significant mitral regurgitation.     Tricuspid Valve  Tricuspid valve leaflets appear normal. There is no evidence of tricuspid  stenosis or clinically significant tricuspid regurgitation. Right ventricular  systolic pressure could not be approximated due to inadequate tricuspid  regurgitation.     Aortic Valve  The aortic valve is trileaflet. There is moderately severe (3+) aortic  regurgitation. No aortic stenosis is present.     Pulmonic Valve  The pulmonic valve is not well seen, but is grossly normal. This degree of  valvular regurgitation is within normal limits. There is trace pulmonic  valvular regurgitation.     Vessels  There is moderate aortic root enlargement. The ascending aorta is Borderline  dilated. IVC diameter <2.1 cm collapsing >50% with sniff suggests a normal RA  pressure of 3 mmHg.     Pericardium  There is no pericardial effusion.       EKG:    Sinus rhythm  Moderate voltage criteria for LVH, may be normal variant  Borderline ECG  No previous ECGs available

## 2023-10-31 NOTE — PATIENT INSTRUCTIONS
You were seen today in the [facility]    Schedule CTA chest/abdomen/pelvis and then call Roxy when you'd like to schedule surgery.    Please call [facility] surgery coordinator with any questions.  Thank you.    Roxy Poole RNCC  Cardiovascular Surgery  Phone [phone]  Fax [phone]

## 2023-10-31 NOTE — TELEPHONE ENCOUNTER
Spoke to pt and he is able to come in to an early appt. He is will arrive at 11:00am. Pt understands changes

## 2023-10-31 NOTE — LETTER
10/31/2023    Sagar Pereira MD  [facility] [address] [place] [id number]    RE: Kory Guerrero       Dear Colleague,     I had the pleasure of seeing Kory Guerrero in the [facility].  Cardiac and Thoracic Surgery Consultation      Kory Guerrero MRN# [id number]   YOB: 1960 Age: 63 year old        Reason for consult: I was asked by Dr. Judy Cook to evaluate this patient for severe aortic regurgitation and moderate aortic root dilation.           Assessment and Plan:   Mr. 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 Coronary angiography demonstrates no coronary artery disease. I recommend aortic valve replacement. Because he also has aortic root enlargement, replacement of the aortic root, either with valve sparing root replacement versus aortic root replacement, could be considered, although the size of the aortic root measured on echocardiography does not meet the cutoff of 5 cm for ACC/AHA guidelines (assuming he does not have a genetic syndrome). I discussed the option of genetic testing with him, and I also discussed the options of aortic valve replacement and continued surveillance of the aortic root versus aortic root replacement outside of current guidelines. He understands that if he has aortic valve replacement and we leave the root, there is a small chance that he could need redo sternotomy and aortic root replacement later. He is going to consider all this and we will make a shared decision understanding the risks and benefits of both approaches. I described the technical details, as well as the expected postoperative course and recovery to the patient. I also discussed the risks and benefits of the procedure. The risks include but are not limited to bleeding, infection, stroke, heart failure, dysrhythmia, respiratory failure, kidney or liver injury, bowel or limb ischemia, and death. The  estimated risk of mortality is 2%, and the estimated risk of major morbidity is 10-15%. The patient understands these risks and wishes to undergo the operation.     I discussed the option of a bioprosthetic versus a mechanical valve with the patient. The patient understands that a bioprosthetic valve would not require lifelong anticoagulation, but there is a risk of prosthetic valve degeneration. I also explained that while a mechanical valve has unlimited durability, this would require lifelong anticoagulation with Coumadin. There is also a small risk of hearing the valve click. The risk of infection is equal between the two.    Surgery will be scheduled in the coming months.    After dental clearance, the preoperative evaluation will be complete.              Chief Complaint:   Mr. 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    History is obtained from the patient         History of Present Illness:   This patient is a 63 year old male who presents to discuss treatment options of his severe aortic regurgitation and aortic root aneurysm. He has noted decreased exercise tolerance and decreased energy. He also has some difficulty like chest tightness when he lies down recently, although I am not sure if this is paroxysmal nocturnal dyspnea. Also, he sometimes uses 2 pillows concerning for orthopnea. He denies lower extremity edema, and he can actually walk several miles without too much difficulty. He hunts birds with his dog and he does not have trouble walking on uneven ground. Overall, it seems like his symptoms are progressing.                Past Medical History:     Past Medical History:   Diagnosis Date    Aortic root dilatation (H24)     Aortic valve disorder 09/2023    Moderate to severe AI    Hypertension              Past Surgical History:     Past Surgical History:   Procedure Laterality Date    CV AO ROOT N/A 10/9/2023    Procedure: Cardiac Aortic  Root;  Surgeon: Edis Brerios MD;  Location: [facility] CATH LAB CV    CV CORONARY ANGIOGRAM N/A 10/9/2023    Procedure: Coronary Angiogram;  Surgeon: Edis Berrios MD;  Location: [facility] CATH LAB CV    CV LEFT HEART CATH N/A 10/9/2023    Procedure: Left Heart Catheterization;  Surgeon: Edis Berrios MD;  Location: [facility] CATH LAB CV    TONSILLECTOMY, ADENOIDECTOMY, COMBINED                 Social History:     Social History     Tobacco Use    Smoking status: Never    Smokeless tobacco: Never   Substance Use Topics    Alcohol use: Yes     Comment: occasional             Family History:     Family History   Problem Relation Age of Onset    Cerebrovascular Disease Mother 75        ischemic stroke    Cerebrovascular Disease Father 75        hemorrhagic    Valvular heart disease Maternal Grandmother              Immunizations:     Immunization History   Administered Date(s) Administered    COVID-19 Monovalent 18+ (Moderna) 04/17/2021, 05/15/2021, 01/05/2022             Allergies:   No Known Allergies          Medications:     Current Outpatient Medications Ordered in Epic   Medication    amLODIPine (NORVASC) 10 MG tablet    magnesium oxide 200 MG TABS    multivitamin, therapeutic (THERA-VIT) TABS tablet     No current Epic-ordered facility-administered medications on file.             Review of Systems:     The 10 point Review of Systems is negative other than noted in the HPI            Physical Exam:   Vitals were reviewed      BP: 134/68 Pulse: 75   Resp: 16        Constitutional:   awake, alert, cooperative, no apparent distress, and appears stated age     Eyes:   lids and lashes normal, sclera clear, and conjunctiva normal     ENT:   normocepalic, without obvious abnormality     Neck:   supple, symmetrical, trachea midline     Lungs:   no increased work of breathing, good air exchange, and no retractions     Cardiovascular:   regular rate and rhythm     Abdomen:   non-distended     Musculoskeletal:    no lower extremity pitting edema present  there is no redness, warmth, or swelling of the joints  full range of motion noted  motor strength is 5 out of 5 all extremities bilaterally     Neurologic:   Mental Status Exam:  Level of Alertness:   awake  Orientation:   person, place, time  Cranial Nerves:  cranial nerves II-XII are grossly intact  Motor Exam:  moves all extremities well and symmetrically     Neuropsychiatric:   General: normal, calm, and normal eye contact  Level of consciousness: alert / normal  Affect: normal     Skin:   no bruising or bleeding, normal skin color, texture, turgor, and no redness, warmth, or swelling          Data:   All laboratory data reviewed  All cardiac studies reviewed by me.  All imaging studies reviewed by me.    CARDIAC CATHETERIZATION:  Left Main   The vessel was visualized by selective angiography and is moderate in size. There was 0% vessel disease.      Left Anterior Descending   The vessel was visualized by selective angiography and is moderate in size. There was 0% vessel disease.      Left Circumflex   The vessel was visualized by selective angiography and is moderate in size. There was 0% vessel disease.      Right Coronary Artery   The vessel was visualized by selective angiography and is moderate in size. There was 0% vessel disease.          TRANSTHORACIC ECHOCARDIOGRAPHY:  Interpretation Summary     1. The left ventricle is mildly dilated. Left ventricular function is  normal.The ejection fraction is 60-65%.  2. Normal right ventricle size and systolic function.  3. The aortic valve is trileaflet. There is moderately severe (3+) aortic  regurgitation. No aortic stenosis is present.  4. There is moderate aortic root enlargement at 45 mm, The ascending aorta is  Borderline dilated at 40 mm.       Left Ventricle  Left ventricular function is normal.The ejection fraction is 60-65%. The left  ventricle is mildly dilated. There is normal left ventricular wall  thickness.  Left ventricular diastolic function is normal. No regional wall motion  abnormalities noted.     Right Ventricle  Normal right ventricle size and systolic function.     Atria  Normal left atrial size. Right atrial size is normal. There is no color  Doppler evidence of an atrial shunt.     Mitral Valve  Mitral valve leaflets appear normal. There is no evidence of mitral stenosis  or clinically significant mitral regurgitation.     Tricuspid Valve  Tricuspid valve leaflets appear normal. There is no evidence of tricuspid  stenosis or clinically significant tricuspid regurgitation. Right ventricular  systolic pressure could not be approximated due to inadequate tricuspid  regurgitation.     Aortic Valve  The aortic valve is trileaflet. There is moderately severe (3+) aortic  regurgitation. No aortic stenosis is present.     Pulmonic Valve  The pulmonic valve is not well seen, but is grossly normal. This degree of  valvular regurgitation is within normal limits. There is trace pulmonic  valvular regurgitation.     Vessels  There is moderate aortic root enlargement. The ascending aorta is Borderline  dilated. IVC diameter <2.1 cm collapsing >50% with sniff suggests a normal RA  pressure of 3 mmHg.     Pericardium  There is no pericardial effusion.       EKG:    Sinus rhythm  Moderate voltage criteria for LVH, may be normal variant  Borderline ECG  No previous ECGs available         Thank you for allowing me to participate in the care of your patient.      Sincerely,     Johnny Mckeon MD     [facility]  cc:   No referring provider defined for this encounter.

## 2023-11-09 ENCOUNTER — HOSPITAL ENCOUNTER (OUTPATIENT)
Dept: CT IMAGING | Facility: CLINIC | Age: 63
Discharge: HOME | End: 2023-11-09
Attending: SURGERY | Admitting: SURGERY
Payer: MEDICARE

## 2023-11-09 DIAGNOSIS — I35.1 NONRHEUMATIC AORTIC VALVE INSUFFICIENCY: ICD-10-CM

## 2023-11-09 DIAGNOSIS — I77.89 AORTIC ROOT ENLARGEMENT (H): ICD-10-CM

## 2023-11-09 PROCEDURE — 74174 CTA ABD&PLVS W/CONTRAST: CPT | Mod: MG

## 2023-11-09 PROCEDURE — 250N000011 HC RX IP 250 OP 636: Mod: JZ | Performed by: SURGERY

## 2023-11-09 RX ORDER — IOPAMIDOL 755 MG/ML
100 INJECTION, SOLUTION INTRAVASCULAR ONCE
Status: COMPLETED | OUTPATIENT
Start: 2023-11-09 | End: 2023-11-09

## 2023-11-09 RX ADMIN — IOPAMIDOL 100 ML: 755 INJECTION, SOLUTION INTRAVENOUS at 17:20

## 2023-11-10 LAB — RADIOLOGIST FLAGS: ABNORMAL

## 2023-11-14 ENCOUNTER — TELEPHONE (OUTPATIENT)
Dept: CARDIOLOGY | Facility: CLINIC | Age: 63
End: 2023-11-14
Payer: COMMERCIAL

## 2023-11-14 DIAGNOSIS — N28.9 LESION OF LEFT NATIVE KIDNEY: Primary | ICD-10-CM

## 2023-11-14 NOTE — TELEPHONE ENCOUNTER
Renal MRI with contast ordered. Referral for urology placed per Dr. Mckeon.    Patient informed of upcoming workup prior to scheduling heart surgery. Provided the pt with phone numbers for radiology and urology scheduling.    Pt informed CV RN he is going to receive a second opinion from [facility] to make sure what has been advised is warranted. All questions addressed at this time.      ----- Message from Johnny Mckeon MD sent at 11/10/2023  3:05 PM CST -----  Camilo Riojas    I talked to him about the aorta. Plan will be possible valve-sparing root replacement, possible biologic Bentall.    On a separate note, he has a 16 mm enhancing nodule on the left kidney that is concerning for a neoplasm. He will need and MRI and a urology referral. His primary care physician, Dr. Sagar Pereira Jr. From [facility] is on vacation this week. I talked with a nurse in his clinic and he will order the MRI and the urology referral on Monday. I will let you know if I need you to do anything.    Thank you  Levon  ----- Message -----  From: Roxy Poole RN  Sent: 11/10/2023  12:20 PM CST  To: Johnny Mckeon MD    I think he'd appreciate hearing from you if you have time! Otherwise I am happy to chat with him too :)    Beulah Martinez  ----- Message -----  From: Johnny Mckeon MD  Sent: 11/10/2023  12:19 PM CST  To: ZOE Desai    I actually measured the sinuses of Valsalva at 4.9 cm so I am glad you got that study. He should definitely have and aortic root replacement. I could do a valve sparing root replacement or a biological Bentall depending on what he wants to do. I doubt he would want a mechanical valve, and I do not think that is necessary at his age. Should I give him a call?    Thank you  Levon      ----- Message -----  From: Roxy Poole RN  Sent: 11/10/2023   8:05 AM CST  To: Johnny Mckeon MD    Hi,    Will you please review his CTA and let me know what you  think the plan for surgery should be? He has severe aortic regurgitation and an aortic root aneurysm of 4.5 cm. He did not have a dedicated CT when you met him in clinic.    Thanks!  Beulah

## 2023-12-11 ENCOUNTER — HOSPITAL ENCOUNTER (OUTPATIENT)
Dept: MRI IMAGING | Facility: CLINIC | Age: 63
Discharge: HOME | End: 2023-12-11
Attending: SURGERY | Admitting: SURGERY
Payer: MEDICARE

## 2023-12-11 DIAGNOSIS — N28.9 LESION OF LEFT NATIVE KIDNEY: ICD-10-CM

## 2023-12-11 PROCEDURE — 255N000002 HC RX 255 OP 636: Mod: JZ | Performed by: SURGERY

## 2023-12-11 PROCEDURE — 74183 MRI ABD W/O CNTR FLWD CNTR: CPT | Mod: MG

## 2023-12-11 PROCEDURE — G1010 CDSM STANSON: HCPCS

## 2023-12-11 PROCEDURE — A9585 GADOBUTROL INJECTION: HCPCS | Mod: JZ | Performed by: SURGERY

## 2023-12-11 RX ORDER — GADOBUTROL 604.72 MG/ML
8 INJECTION INTRAVENOUS ONCE
Status: COMPLETED | OUTPATIENT
Start: 2023-12-11 | End: 2023-12-11

## 2023-12-11 RX ADMIN — GADOBUTROL 8 ML: 604.72 INJECTION INTRAVENOUS at 08:23

## 2023-12-14 ENCOUNTER — MEDICAL CORRESPONDENCE (OUTPATIENT)
Dept: HEALTH INFORMATION MANAGEMENT | Facility: CLINIC | Age: 63
End: 2023-12-14
Payer: COMMERCIAL

## 2023-12-15 ENCOUNTER — TELEPHONE (OUTPATIENT)
Dept: CARDIOLOGY | Facility: CLINIC | Age: 63
End: 2023-12-15
Payer: COMMERCIAL

## 2023-12-15 NOTE — TELEPHONE ENCOUNTER
Called patient to follow up. He spoke to Dr. Fulton with [facility] yesterday and plan is to follow up with a CT in May 2024. Pt has decided to have his heart surgery at [facility]. Dr. Mckeon updated.

## 2023-12-31 ENCOUNTER — HEALTH MAINTENANCE LETTER (OUTPATIENT)
Age: 63
End: 2023-12-31

## 2024-03-27 ENCOUNTER — TRANSFERRED RECORDS (OUTPATIENT)
Dept: HEALTH INFORMATION MANAGEMENT | Facility: CLINIC | Age: 64
End: 2024-03-27
Payer: COMMERCIAL

## 2024-04-03 ENCOUNTER — OFFICE VISIT (OUTPATIENT)
Dept: CARDIOLOGY | Facility: CLINIC | Age: 64
End: 2024-04-03
Payer: COMMERCIAL

## 2024-04-03 VITALS
HEIGHT: 73 IN | DIASTOLIC BLOOD PRESSURE: 96 MMHG | HEART RATE: 64 BPM | SYSTOLIC BLOOD PRESSURE: 148 MMHG | RESPIRATION RATE: 16 BRPM | WEIGHT: 182 LBS | BODY MASS INDEX: 24.12 KG/M2

## 2024-04-03 DIAGNOSIS — I48.0 PAROXYSMAL ATRIAL FIBRILLATION (H): ICD-10-CM

## 2024-04-03 DIAGNOSIS — I10 BENIGN ESSENTIAL HYPERTENSION: Primary | ICD-10-CM

## 2024-04-03 DIAGNOSIS — I51.7 LEFT VENTRICULAR ENLARGEMENT: ICD-10-CM

## 2024-04-03 DIAGNOSIS — Z95.828 H/O ASCENDING AORTIC REPLACEMENT: ICD-10-CM

## 2024-04-03 DIAGNOSIS — I50.20: ICD-10-CM

## 2024-04-03 DIAGNOSIS — Z95.2 H/O MECHANICAL AORTIC VALVE REPLACEMENT: ICD-10-CM

## 2024-04-03 PROCEDURE — 99417 PROLNG OP E/M EACH 15 MIN: CPT | Performed by: INTERNAL MEDICINE

## 2024-04-03 PROCEDURE — G2211 COMPLEX E/M VISIT ADD ON: HCPCS | Performed by: INTERNAL MEDICINE

## 2024-04-03 PROCEDURE — 99215 OFFICE O/P EST HI 40 MIN: CPT | Performed by: INTERNAL MEDICINE

## 2024-04-03 RX ORDER — METOPROLOL SUCCINATE 50 MG/1
2 TABLET, EXTENDED RELEASE ORAL DAILY
COMMUNITY
Start: 2024-02-28 | End: 2024-07-11

## 2024-04-03 RX ORDER — WARFARIN SODIUM 4 MG/1
10 TABLET ORAL DAILY
COMMUNITY

## 2024-04-03 RX ORDER — ASPIRIN 81 MG/1
81 TABLET, CHEWABLE ORAL DAILY
COMMUNITY
Start: 2024-02-28

## 2024-04-03 RX ORDER — HYDRALAZINE HYDROCHLORIDE 10 MG/1
10 TABLET, FILM COATED ORAL 3 TIMES DAILY PRN
COMMUNITY
Start: 2024-03-28 | End: 2024-06-11

## 2024-04-03 RX ORDER — LISINOPRIL 5 MG/1
5 TABLET ORAL DAILY
COMMUNITY
Start: 2024-03-28 | End: 2024-04-03

## 2024-04-03 RX ORDER — LISINOPRIL 10 MG/1
10 TABLET ORAL DAILY
Qty: 90 TABLET | Refills: 3 | Status: SHIPPED | OUTPATIENT
Start: 2024-04-03 | End: 2024-06-11

## 2024-04-03 NOTE — PATIENT INSTRUCTIONS
Please contact direct nurses line Monday through Friday 8 AM to 5 PM @ [phone]    After-hours contact cardiology office at [phone].    Plan:    Increase lisinopril to 10 mg daily.  My chart with BP reading in 1 week  Repeat echo in 6 months       You had a sternotomy, avoid lifting anything greater than ten pounds for 8 weeks after surgery and then less than 20 pounds for an additional 4 weeks. Do not reach backwards or use arms to push out of chair. Do not let people pull on your arms to assist with standing. Avoid twisting or reaching too far across your body.  Avoid strenuous activities such as bowling, vacuuming, raking, shoveling, golf or tennis for 12 weeks after your surgery. It is okay to resume sex if you feel comfortable in doing so. You may have to try different positions with your partner.  Splint your chest incision by hugging a pillow or bringing your arms across your chest when coughing or sneezing. Please try to sleep on your back for the first 4-6 weeks to avoid extra stress on your sternum (breastbone) while it is healing.     No driving for 4 weeks after surgery or while on pain medication.     Shower or wash your incisions twice daily with soap and water (or as instructed), pat dry. Keep wound clean and dry, showers are okay after discharge, but don't let spray hit directly on incision. No baths or swimming for 1 month. Cover chest tube sites with gauze until they stop draining, then leave open to air. It is not abnormal for chest tube sites to drain yellowish/clear fluid for up to 2-3 weeks after surgery.   Watch for signs of infection: increased redness, tenderness, warmth or any drainage from sternum incision.  Also a temperature > 100.5 F or chills. Call your surgeon or primary care provider's office immediately. Remove any skin glue left on incisions after 10-14 days. This will not affect your incision and can speed up healing.      DENTAL VISITS AFTER SURGERY  If you have had  "your heart valve repaired or replaced, we do not recommend having any dental work done for 6 months and you will need to take an antibiotic prior to dental visits from now on.  Please notify your dentist before any procedure for the proper treatment needed. The antibiotic is taken by mouth one hour prior to visit. This includes routine cleanings.  You can sometimes hear a mechanical valve \"clicking,\" this is normal and not a sign of something wrong.  "

## 2024-04-03 NOTE — LETTER
4/3/2024    Sagar Pereira MD  [facility] [address] [place] [id number]    RE: Kory Guerrero       Dear Colleague,     I had the pleasure of seeing Kory Guerrero in the [facility].    HEART CARE ENCOUNTER CONSULTATON NOTE      [facility]  [phone]      Assessment/Recommendations   Assessment:   Severe aortic valve regurgitation status post 25 mm On-X Ascending Aortic mechanical aortic valve prosthesis (20-FEB-2024).   2.  Ascending aortic aneurysm status post ascending aorta replacement and hemiarch repair ([place]).     3. Cardiac Arrest 2/23 (POD#2), [place], [place] in setting of severe hypokalemia.    4. Atrial fibrillation and atrial flutter   5.  Biventricular heart failure with mild LV systolic dysfunction and LV enlargement   6. Ventrricular tachycardia   7.  Chronic anticoagulation with warfarin    Plan:   Increase lisinopril to 10 mg daily, continue to titrate as tolerated  2.   Repeat echo in 6 months for left ventricular systolic dysfunction and left ventricular enlargement  3.  Continue anticoagulation mechanical aortic valve.  INR goal could change after 3 months to 1.8-2.2 if he has home monitoring given On-X valve   4.  Continue metoprolol XL 50 mg daily  5.  Continue exercise  6.  Continue hydralazine 10 mg 3 times daily at this time  7.  Will require prophylactic antibiotic therapy prior to dental cleaning, amoxicillin 2000 mg       History of Present Illness/Subjective    HPI: Kory Guerrero is a 64 year old male with history of aortic aneurysm and severe aortic insufficiency who presents to cardiology clinic at [facility] to self care with myself.    Patient was evaluated in 2023 for aortic valve replacement.  During his evaluation he was seen by Dr. Mckeon and was planning to proceed with surgery.  Patient later obtained a second opinion at [facility] and proceeded with surgical intervention at [facility].  He underwent successful  hemiarch repair and ascending aortic replacement along with replacement of his aortic valve with a On-x mechanical aortic valve prosthesis.  Unfortunately during his hospitalization he had a cardiac arrest resulting in CPR in the setting of ventricular tachycardia.  It was noted that he had severe hypokalemia at the time of his cardiac arrest and had just had his pacer wires removed.    Patient also had episodes of atrial fibrillation which he had experienced prior to surgery.  At the time of discharge she was discharged on Toprol XL and amiodarone.  After discharge she was seen in the emergency department for bradycardia and hypotension.  This was felt to be related to his amiodarone and beta-blocker therapy.  Amiodarone was discontinued and metoprolol was reduced.    Since that time he has been doing very well.  He has continued to progress in cardiac rehab.  He denies any active chest pain.  No bradycardic issues.    He has no active sternal chest pain.  Occasionally feels some minor pain at the surface of his chest.  Denies any significant dyspnea on exertion, no lightheadedness or syncope.  No lower extremity edema.    Echocardiogram Results:   2. Severe LV enlargement with mildly increased wall thickness. Calculated 3D LVEF 45%.(Mildly reduced function). Indeterminate diastolic function.     3. Abnormal septal wall motion related to prior surgery.     4. Normal RV size and systolic function.     5. s/p 25 mm On-X mechanical bileaflet aortic valve prosthesis (2/20/2024). Mean systolic gradient 8 mmHg. Mild prosthetic and periprosthetic regurgitation.     6. s/p aortic aneurysm repair with 28 mm Hemashield (2/20/2024).     7. The IVC measures <2.1 cm with >50% collapse upon inspiration consistent with normal right atrial pressure, 3 mmHg.     8. There is a trivial pericardial effusion.     9. Compared to the prior study from 02/27/2024 at [facility], there have been no significant changes.     Coronary  "Angiogram: 10/9/2023  1.  Normal-appearing epicardial coronary arteries in a right dominant system.  2.  Aortic root and ascending aorta enlargement with moderate effacement of the sinotubular ridge.  3.  Elevated left ventricular end-diastolic pressure 27 mmHg  4.  Moderately severe to severe aortic regurgitation; no aortic stenosis.  Aortic valve leaflets are noncalcified and appear thin and mobile.  5.  Left ventricle visualized via aortic root angio.  LV appears mildly enlarged with systolic function at the lower limit of normal.       Physical Examination  Review of Systems   Vitals: BP (!) 148/96 (BP Location: Left arm, Patient Position: Sitting, Cuff Size: Adult Large)   Pulse 64   Resp 16   Ht 1.854 m (6' 1\")   Wt 82.6 kg (182 lb)   BMI 24.01 kg/m    BMI= Body mass index is 24.01 kg/m .  Wt Readings from Last 3 Encounters:   04/03/24 82.6 kg (182 lb)   10/31/23 82.6 kg (182 lb)   10/09/23 82.6 kg (182 lb)        Pleasant   ENT/Mouth: membranes moist, no oral lesions or bleeding gums.      EYES:  no scleral icterus, normal conjunctivae       Chest/Lungs:   lungs are clear to auscultation, no rales or wheezing, healing sternal scar, equal chest wall expansion    Cardiovascular:   Regular. Normal first and second heart sounds with mechanical click with faint systolic murmurs, rubs, or gallops; the carotid, radial and posterior tibial pulses are intact, Jugular venous pressure normal, no edema bilaterally    Abdomen:  no tenderness; bowel sounds are present   Extremities: no cyanosis or clubbing   Skin: no xanthelasma, warm.    Neurologic: normal  bilateral, no tremors     Psychiatric: alert and oriented x3, calm        Please refer above for cardiac ROS details.        Medical History  Surgical History Family History Social History   Past Medical History:   Diagnosis Date    Aortic root dilatation (H24)     Aortic valve disorder 09/2023    Moderate to severe AI    Hypertension      Past Surgical " History:   Procedure Laterality Date    CV AO ROOT N/A 10/9/2023    Procedure: Cardiac Aortic Root;  Surgeon: Edis Berrios MD;  Location: [facility] CATH LAB CV    CV CORONARY ANGIOGRAM N/A 10/9/2023    Procedure: Coronary Angiogram;  Surgeon: Edis Berrios MD;  Location: [facility] CATH LAB CV    CV LEFT HEART CATH N/A 10/9/2023    Procedure: Left Heart Catheterization;  Surgeon: Edis Berrios MD;  Location: [facility] CATH LAB CV    TONSILLECTOMY, ADENOIDECTOMY, COMBINED       Family History   Problem Relation Age of Onset    Cerebrovascular Disease Mother 75        ischemic stroke    Cerebrovascular Disease Father 75        hemorrhagic    Valvular heart disease Maternal Grandmother         Social History     Socioeconomic History    Marital status:      Spouse name: Not on file    Number of children: Not on file    Years of education: Not on file    Highest education level: Not on file   Occupational History    Not on file   Tobacco Use    Smoking status: Never    Smokeless tobacco: Never   Vaping Use    Vaping Use: Never used   Substance and Sexual Activity    Alcohol use: Yes     Comment: occasional    Drug use: Never    Sexual activity: Not on file   Other Topics Concern    Not on file   Social History Narrative    Not on file     Social Determinants of Health     Financial Resource Strain: Not on file   Food Insecurity: Not on file   Transportation Needs: Not on file   Physical Activity: Not on file   Stress: Not on file   Social Connections: Not on file   Interpersonal Safety: Not on file   Housing Stability: Not on file           Medications  Allergies   Current Outpatient Medications   Medication Sig Dispense Refill    aspirin (ASA) 81 MG chewable tablet Take 81 mg by mouth daily      diphenhydrAMINE-acetaminophen (TYLENOL PM EXTRA STRENGTH)  MG tablet Take 2 tablets by mouth at bedtime      hydrALAZINE (APRESOLINE) 10 MG tablet Take 10 mg by mouth 3 times daily as needed for high blood  "pressure      lisinopril (ZESTRIL) 5 MG tablet Take 5 mg by mouth daily      magnesium oxide 200 MG TABS Take 200 mg by mouth daily      metoprolol succinate ER (TOPROL XL) 50 MG 24 hr tablet Take 2 tablets by mouth daily      multivitamin, therapeutic (THERA-VIT) TABS tablet Take 1 tablet by mouth daily      warfarin ANTICOAGULANT (COUMADIN) 4 MG tablet Take 2 tablets by mouth daily       No Known Allergies       Lab Results    Chemistry/lipid CBC Cardiac Enzymes/BNP/TSH/INR   Recent Labs   Lab Test 05/15/23  0914   TRIG 161     No results for input(s): \"LDL\" in the last 80617 hours.  Recent Labs   Lab Test 10/09/23  0752      POTASSIUM 3.6   CHLORIDE 104   CO2 25   *   BUN 13.2   CR 0.86   GFRESTIMATED >90   MARILUZ 8.7*     Recent Labs   Lab Test 10/09/23  0752   CR 0.86     No results for input(s): \"A1C\" in the last 12318 hours.       Recent Labs   Lab Test 10/09/23  0752   WBC 4.3   HGB 14.9   HCT 42.2   MCV 89        Recent Labs   Lab Test 10/09/23  0752   HGB 14.9    No results for input(s): \"TROPONINI\" in the last 87871 hours.  No results for input(s): \"BNP\", \"NTBNPI\", \"NTBNP\" in the last 07220 hours.  No results for input(s): \"TSH\" in the last 03025 hours.  No results for input(s): \"INR\" in the last 98096 hours.     Jayden Mcnamara DO    Today's clinic visit entailed:  57 minutes spent by me on the date of the encounter doing chart review, history and exam, documentation and further activities per the note.      Patient require longitudinal management of his left ventricular failure dysfunction, hypertension, aortic valve disease, aortopathy and anticoagulation for mechanical aortic valve.    Thank you for allowing me to participate in the care of your patient.      Sincerely,     Jayden Mcnaamra DO     [facility]  cc:   Jayden Mcnamara DO  [address] [place] [address]      "

## 2024-04-03 NOTE — PROGRESS NOTES
HEART CARE ENCOUNTER CONSULTATON NOTE      [facility]  [phone]      Assessment/Recommendations   Assessment:   Severe aortic valve regurgitation status post 25 mm On-X Ascending Aortic mechanical aortic valve prosthesis (20-FEB-2024).   2.  Ascending aortic aneurysm status post ascending aorta replacement and hemiarch repair ([place]).     3. Cardiac Arrest 2/23 (POD#2), [place], [place] in setting of severe hypokalemia.    4. Atrial fibrillation and atrial flutter   5.  Biventricular heart failure with mild LV systolic dysfunction and LV enlargement   6. Ventrricular tachycardia   7.  Chronic anticoagulation with warfarin    Plan:   Increase lisinopril to 10 mg daily, continue to titrate as tolerated  2.   Repeat echo in 6 months for left ventricular systolic dysfunction and left ventricular enlargement  3.  Continue anticoagulation mechanical aortic valve.  INR goal could change after 3 months to 1.8-2.2 if he has home monitoring given On-X valve   4.  Continue metoprolol XL 50 mg daily  5.  Continue exercise  6.  Continue hydralazine 10 mg 3 times daily at this time  7.  Will require prophylactic antibiotic therapy prior to dental cleaning, amoxicillin 2000 mg       History of Present Illness/Subjective    HPI: Kory Guerrero is a 64 year old male with history of aortic aneurysm and severe aortic insufficiency who presents to cardiology clinic at [facility] to self care with myself.    Patient was evaluated in 2023 for aortic valve replacement.  During his evaluation he was seen by Dr. Mckeon and was planning to proceed with surgery.  Patient later obtained a second opinion at [facility] and proceeded with surgical intervention at [facility].  He underwent successful hemiarch repair and ascending aortic replacement along with replacement of his aortic valve with a On-x mechanical aortic valve prosthesis.  Unfortunately during his hospitalization he had a cardiac arrest resulting in CPR  in the setting of ventricular tachycardia.  It was noted that he had severe hypokalemia at the time of his cardiac arrest and had just had his pacer wires removed.    Patient also had episodes of atrial fibrillation which he had experienced prior to surgery.  At the time of discharge she was discharged on Toprol XL and amiodarone.  After discharge she was seen in the emergency department for bradycardia and hypotension.  This was felt to be related to his amiodarone and beta-blocker therapy.  Amiodarone was discontinued and metoprolol was reduced.    Since that time he has been doing very well.  He has continued to progress in cardiac rehab.  He denies any active chest pain.  No bradycardic issues.    He has no active sternal chest pain.  Occasionally feels some minor pain at the surface of his chest.  Denies any significant dyspnea on exertion, no lightheadedness or syncope.  No lower extremity edema.    Echocardiogram Results:   2. Severe LV enlargement with mildly increased wall thickness. Calculated 3D LVEF 45%.(Mildly reduced function). Indeterminate diastolic function.     3. Abnormal septal wall motion related to prior surgery.     4. Normal RV size and systolic function.     5. s/p 25 mm On-X mechanical bileaflet aortic valve prosthesis (2/20/2024). Mean systolic gradient 8 mmHg. Mild prosthetic and periprosthetic regurgitation.     6. s/p aortic aneurysm repair with 28 mm Hemashield (2/20/2024).     7. The IVC measures <2.1 cm with >50% collapse upon inspiration consistent with normal right atrial pressure, 3 mmHg.     8. There is a trivial pericardial effusion.     9. Compared to the prior study from 02/27/2024 at [facility], there have been no significant changes.     Coronary Angiogram: 10/9/2023  1.  Normal-appearing epicardial coronary arteries in a right dominant system.  2.  Aortic root and ascending aorta enlargement with moderate effacement of the sinotubular ridge.  3.  Elevated left ventricular  "end-diastolic pressure 27 mmHg  4.  Moderately severe to severe aortic regurgitation; no aortic stenosis.  Aortic valve leaflets are noncalcified and appear thin and mobile.  5.  Left ventricle visualized via aortic root angio.  LV appears mildly enlarged with systolic function at the lower limit of normal.       Physical Examination  Review of Systems   Vitals: BP (!) 148/96 (BP Location: Left arm, Patient Position: Sitting, Cuff Size: Adult Large)   Pulse 64   Resp 16   Ht 1.854 m (6' 1\")   Wt 82.6 kg (182 lb)   BMI 24.01 kg/m    BMI= Body mass index is 24.01 kg/m .  Wt Readings from Last 3 Encounters:   04/03/24 82.6 kg (182 lb)   10/31/23 82.6 kg (182 lb)   10/09/23 82.6 kg (182 lb)        Pleasant   ENT/Mouth: membranes moist, no oral lesions or bleeding gums.      EYES:  no scleral icterus, normal conjunctivae       Chest/Lungs:   lungs are clear to auscultation, no rales or wheezing, healing sternal scar, equal chest wall expansion    Cardiovascular:   Regular. Normal first and second heart sounds with mechanical click with faint systolic murmurs, rubs, or gallops; the carotid, radial and posterior tibial pulses are intact, Jugular venous pressure normal, no edema bilaterally    Abdomen:  no tenderness; bowel sounds are present   Extremities: no cyanosis or clubbing   Skin: no xanthelasma, warm.    Neurologic: normal  bilateral, no tremors     Psychiatric: alert and oriented x3, calm        Please refer above for cardiac ROS details.        Medical History  Surgical History Family History Social History   Past Medical History:   Diagnosis Date    Aortic root dilatation ([facility]24)     Aortic valve disorder 09/2023    Moderate to severe AI    Hypertension      Past Surgical History:   Procedure Laterality Date    CV AO ROOT N/A 10/9/2023    Procedure: Cardiac Aortic Root;  Surgeon: Edis Berrios MD;  Location: [facility] CATH LAB CV    CV CORONARY ANGIOGRAM N/A 10/9/2023    Procedure: Coronary Angiogram;  " Surgeon: Edis Berrios MD;  Location: [facility] CV    CV LEFT HEART CATH N/A 10/9/2023    Procedure: Left Heart Catheterization;  Surgeon: Edis Berrios MD;  Location: [facility] LAB CV    TONSILLECTOMY, ADENOIDECTOMY, COMBINED       Family History   Problem Relation Age of Onset    Cerebrovascular Disease Mother 75        ischemic stroke    Cerebrovascular Disease Father 75        hemorrhagic    Valvular heart disease Maternal Grandmother         Social History     Socioeconomic History    Marital status:      Spouse name: Not on file    Number of children: Not on file    Years of education: Not on file    Highest education level: Not on file   Occupational History    Not on file   Tobacco Use    Smoking status: Never    Smokeless tobacco: Never   Vaping Use    Vaping Use: Never used   Substance and Sexual Activity    Alcohol use: Yes     Comment: occasional    Drug use: Never    Sexual activity: Not on file   Other Topics Concern    Not on file   Social History Narrative    Not on file     Social Determinants of Health     Financial Resource Strain: Not on file   Food Insecurity: Not on file   Transportation Needs: Not on file   Physical Activity: Not on file   Stress: Not on file   Social Connections: Not on file   Interpersonal Safety: Not on file   Housing Stability: Not on file           Medications  Allergies   Current Outpatient Medications   Medication Sig Dispense Refill    aspirin (ASA) 81 MG chewable tablet Take 81 mg by mouth daily      diphenhydrAMINE-acetaminophen (TYLENOL PM EXTRA STRENGTH)  MG tablet Take 2 tablets by mouth at bedtime      hydrALAZINE (APRESOLINE) 10 MG tablet Take 10 mg by mouth 3 times daily as needed for high blood pressure      lisinopril (ZESTRIL) 5 MG tablet Take 5 mg by mouth daily      magnesium oxide 200 MG TABS Take 200 mg by mouth daily      metoprolol succinate ER (TOPROL XL) 50 MG 24 hr tablet Take 2 tablets by mouth daily       "multivitamin, therapeutic (THERA-VIT) TABS tablet Take 1 tablet by mouth daily      warfarin ANTICOAGULANT (COUMADIN) 4 MG tablet Take 2 tablets by mouth daily       No Known Allergies       Lab Results    Chemistry/lipid CBC Cardiac Enzymes/BNP/TSH/INR   Recent Labs   Lab Test 05/15/23  0914   TRIG 161     No results for input(s): \"LDL\" in the last 67258 hours.  Recent Labs   Lab Test 10/09/23  0752      POTASSIUM 3.6   CHLORIDE 104   CO2 25   *   BUN 13.2   CR 0.86   GFRESTIMATED >90   MARILUZ 8.7*     Recent Labs   Lab Test 10/09/23  0752   CR 0.86     No results for input(s): \"A1C\" in the last 88982 hours.       Recent Labs   Lab Test 10/09/23  0752   WBC 4.3   HGB 14.9   HCT 42.2   MCV 89        Recent Labs   Lab Test 10/09/23  0752   HGB 14.9    No results for input(s): \"TROPONINI\" in the last 07918 hours.  No results for input(s): \"BNP\", \"NTBNPI\", \"NTBNP\" in the last 22209 hours.  No results for input(s): \"TSH\" in the last 27146 hours.  No results for input(s): \"INR\" in the last 48511 hours.     Jayden Mcnamara,     Today's clinic visit entailed:  57 minutes spent by me on the date of the encounter doing chart review, history and exam, documentation and further activities per the note.      Patient require longitudinal management of his left ventricular failure dysfunction, hypertension, aortic valve disease, aortopathy and anticoagulation for mechanical aortic valve.                               "

## 2024-06-11 ENCOUNTER — OFFICE VISIT (OUTPATIENT)
Dept: CARDIOLOGY | Facility: CLINIC | Age: 64
End: 2024-06-11
Attending: INTERNAL MEDICINE
Payer: COMMERCIAL

## 2024-06-11 VITALS
RESPIRATION RATE: 16 BRPM | SYSTOLIC BLOOD PRESSURE: 144 MMHG | DIASTOLIC BLOOD PRESSURE: 94 MMHG | WEIGHT: 183.7 LBS | HEIGHT: 72 IN | BODY MASS INDEX: 24.88 KG/M2 | HEART RATE: 72 BPM

## 2024-06-11 DIAGNOSIS — Z95.828 H/O ASCENDING AORTIC REPLACEMENT: ICD-10-CM

## 2024-06-11 DIAGNOSIS — Z95.2 H/O MECHANICAL AORTIC VALVE REPLACEMENT: ICD-10-CM

## 2024-06-11 DIAGNOSIS — I48.0 PAROXYSMAL ATRIAL FIBRILLATION (H): ICD-10-CM

## 2024-06-11 DIAGNOSIS — I51.7 LEFT VENTRICULAR ENLARGEMENT: ICD-10-CM

## 2024-06-11 DIAGNOSIS — I10 BENIGN ESSENTIAL HYPERTENSION: ICD-10-CM

## 2024-06-11 DIAGNOSIS — I50.20: ICD-10-CM

## 2024-06-11 PROCEDURE — G2211 COMPLEX E/M VISIT ADD ON: HCPCS | Performed by: INTERNAL MEDICINE

## 2024-06-11 PROCEDURE — 99214 OFFICE O/P EST MOD 30 MIN: CPT | Performed by: INTERNAL MEDICINE

## 2024-06-11 RX ORDER — LISINOPRIL 20 MG/1
20 TABLET ORAL DAILY
Qty: 90 TABLET | Refills: 3 | Status: SHIPPED | OUTPATIENT
Start: 2024-06-11 | End: 2024-09-09

## 2024-06-11 RX ORDER — LISINOPRIL 10 MG/1
20 TABLET ORAL DAILY
Qty: 90 TABLET | Refills: 3 | Status: SHIPPED | OUTPATIENT
Start: 2024-06-11 | End: 2024-06-11

## 2024-06-11 NOTE — PROGRESS NOTES
HEART CARE ENCOUNTER CONSULTATON NOTE      [facility]  [phone]      Assessment/Recommendations   Assessment:   Severe aortic valve regurgitation status post 25 mm On-X Ascending Aortic mechanical aortic valve prosthesis (20-FEB-2024).   2.  Ascending aortic aneurysm status post ascending aorta replacement and hemiarch repair ([place]).     3.  Cardiac Arrest 2/23 (POD#2), [place] in setting of severe hypokalemia.    4.  Atrial fibrillation and atrial flutter   5.  Biventricular heart failure with mild LV systolic dysfunction and LV enlargement   6.  Ventricular tachycardia   7.  Chronic anticoagulation with warfarin.  Recent INR < 2.0 (warfarin adjustment made).     Plan:   Increase lisinopril to 20 mg daily, continue to titrate as tolerated  2.   Repeat echo for left ventricular systolic dysfunction and left ventricular enlargement  3.  Continue anticoagulation mechanical aortic valve.  INR goal could change after 3 months to 1.8-2.2 if he has home monitoring given On-X valve   4.  Continue metoprolol XL 50 mg daily  5.  Continue exercise routine  6.  Will require prophylactic antibiotic therapy prior to dental cleaning, amoxicillin 2000 mg       History of Present Illness/Subjective    HPI: Kory Guerrero is a 64 year old male with history of aortic aneurysm and severe aortic insufficiency who presents to cardiology clinic at [facility] to self care with myself.    Patient was evaluated in 2023 for aortic valve replacement.  During his evaluation he was seen by Dr. Mckeon and was planning to proceed with surgery.  Patient later obtained a second opinion at [facility] and proceeded with surgical intervention at [facility].  He underwent successful hemiarch repair and ascending aortic replacement along with replacement of his aortic valve with a On-x mechanical aortic valve prosthesis.  Unfortunately during his hospitalization he had a cardiac arrest resulting in CPR in the  setting of ventricular tachycardia.  It was noted that he had severe hypokalemia at the time of his cardiac arrest and had just had his pacer wires removed.    Patient also had episodes of atrial fibrillation which he had experienced prior to surgery.  At the time of discharge she was discharged on Toprol XL and amiodarone.  After discharge she was seen in the emergency department for bradycardia and hypotension.  This was felt to be related to his amiodarone and beta-blocker therapy.  Amiodarone was discontinued and metoprolol was reduced.    Completed cardiac rehab.   Chest wall pain improving.      Echocardiogram Results: [place]    2. Severe LV enlargement with mildly increased wall thickness. Calculated 3D LVEF 45%.(Mildly reduced function). Indeterminate diastolic function.     3. Abnormal septal wall motion related to prior surgery.     4. Normal RV size and systolic function.     5. s/p 25 mm On-X mechanical bileaflet aortic valve prosthesis (2/20/2024). Mean systolic gradient 8 mmHg. Mild prosthetic and periprosthetic regurgitation.     6. s/p aortic aneurysm repair with 28 mm Hemashield (2/20/2024).     7. The IVC measures <2.1 cm with >50% collapse upon inspiration consistent with normal right atrial pressure, 3 mmHg.     8. There is a trivial pericardial effusion.     9. Compared to the prior study from 02/27/2024 at [facility], there have been no significant changes.     Coronary Angiogram: 10/9/2023  1.  Normal-appearing epicardial coronary arteries in a right dominant system.  2.  Aortic root and ascending aorta enlargement with moderate effacement of the sinotubular ridge.  3.  Elevated left ventricular end-diastolic pressure 27 mmHg  4.  Moderately severe to severe aortic regurgitation; no aortic stenosis.  Aortic valve leaflets are noncalcified and appear thin and mobile.  5.  Left ventricle visualized via aortic root angio.  LV appears mildly enlarged with systolic function at the lower limit of  normal.       Physical Examination  Review of Systems   Vitals: BP (!) 144/94 (BP Location: Right arm, Patient Position: Sitting, Cuff Size: Adult Regular)   Pulse 72   Resp 16   Ht 1.829 m (6')   Wt 83.3 kg (183 lb 11.2 oz)   BMI 24.91 kg/m    BMI= Body mass index is 24.91 kg/m .  Wt Readings from Last 3 Encounters:   06/11/24 83.3 kg (183 lb 11.2 oz)   04/03/24 82.6 kg (182 lb)   10/31/23 82.6 kg (182 lb)        Pleasant   ENT/Mouth: membranes moist, no oral lesions or bleeding gums.      EYES:  no scleral icterus, normal conjunctivae       Chest/Lungs:   lungs are clear to auscultation, no rales or wheezing, healing sternal scar, equal chest wall expansion    Cardiovascular:   Regular. Normal first and second heart sounds with mechanical click with no systolic murmurs, rubs, or gallops; the carotid, radial and posterior tibial pulses are intact, Jugular venous pressure normal, no edema bilaterally    Abdomen:  no tenderness; bowel sounds are present   Extremities: no cyanosis or clubbing   Skin: no xanthelasma, warm.    Neurologic: normal  bilateral, no tremors     Psychiatric: alert and oriented x3, calm        Please refer above for cardiac ROS details.        Medical History  Surgical History Family History Social History   Past Medical History:   Diagnosis Date    Aortic root dilatation (H24)     Aortic valve disorder 09/2023    Moderate to severe AI    Hypertension      Past Surgical History:   Procedure Laterality Date    CV AO ROOT N/A 10/9/2023    Procedure: Cardiac Aortic Root;  Surgeon: Edis Berrios MD;  Location: [facility] CV    CV CORONARY ANGIOGRAM N/A 10/9/2023    Procedure: Coronary Angiogram;  Surgeon: Edis Berrios MD;  Location: [facility] CV    CV LEFT HEART CATH N/A 10/9/2023    Procedure: Left Heart Catheterization;  Surgeon: Edis Berrios MD;  Location: [facility] CV    TONSILLECTOMY, ADENOIDECTOMY, COMBINED       Family History   Problem Relation  Age of Onset    Cerebrovascular Disease Mother 75        ischemic stroke    Cerebrovascular Disease Father 75        hemorrhagic    Valvular heart disease Maternal Grandmother         Social History     Socioeconomic History    Marital status:      Spouse name: Not on file    Number of children: Not on file    Years of education: Not on file    Highest education level: Not on file   Occupational History    Not on file   Tobacco Use    Smoking status: Never    Smokeless tobacco: Never   Vaping Use    Vaping status: Never Used   Substance and Sexual Activity    Alcohol use: Yes     Comment: occasional    Drug use: Never    Sexual activity: Not on file   Other Topics Concern    Not on file   Social History Narrative    Not on file     Social Determinants of Health     Financial Resource Strain: Low Risk  (11/8/2023)    Received from [facility]    Overall Financial Resource Strain (CARDIA)     Difficulty of Paying Living Expenses: Not hard at all   Food Insecurity: No Food Insecurity (2/25/2024)    Received from [facility]    Hunger Vital Sign     Worried About Running Out of Food in the Last Year: Never true     Ran Out of Food in the Last Year: Never true   Transportation Needs: No Transportation Needs (2/25/2024)    Received from [facility]    PRAPARE - Transportation     Lack of Transportation (Medical): No     Lack of Transportation (Non-Medical): No   Physical Activity: Sufficiently Active (11/8/2023)    Received from [facility]    Exercise Vital Sign     Days of Exercise per Week: 7 days     Minutes of Exercise per Session: 70 min   Stress: Not on file   Social Connections: Not on file   Interpersonal Safety: Not At Risk (2/25/2024)    Received from [facility]    Humiliation, Afraid, Rape, and Kick questionnaire     Fear of Current or Ex-Partner: No     Emotionally Abused: No     Physically Abused: No     Sexually Abused: No   Housing  "Stability: Low Risk  (2/25/2024)    [facility]    Housing Stability     What is your living situation today?: I have a steady place to live           Medications  Allergies   Current Outpatient Medications   Medication Sig Dispense Refill    aspirin (ASA) 81 MG chewable tablet Take 81 mg by mouth daily      lisinopril (ZESTRIL) 10 MG tablet Take 1 tablet (10 mg) by mouth daily 90 tablet 3    magnesium oxide 200 MG TABS Take 200 mg by mouth daily      metoprolol succinate ER (TOPROL XL) 50 MG 24 hr tablet Take 2 tablets by mouth daily      multivitamin, therapeutic (THERA-VIT) TABS tablet Take 1 tablet by mouth daily      warfarin ANTICOAGULANT (COUMADIN) 4 MG tablet Take 10 mg by mouth daily       No Known Allergies       Lab Results    Chemistry/lipid CBC Cardiac Enzymes/BNP/TSH/INR   Recent Labs   Lab Test 05/15/23  0914   TRIG 161     No results for input(s): \"LDL\" in the last 32730 hours.  Recent Labs   Lab Test 10/09/23  0752      POTASSIUM 3.6   CHLORIDE 104   CO2 25   *   BUN 13.2   CR 0.86   GFRESTIMATED >90   MARILUZ 8.7*     Recent Labs   Lab Test 10/09/23  0752   CR 0.86     No results for input(s): \"A1C\" in the last 12063 hours.       Recent Labs   Lab Test 10/09/23  0752   WBC 4.3   HGB 14.9   HCT 42.2   MCV 89        Recent Labs   Lab Test 10/09/23  0752   HGB 14.9    No results for input(s): \"TROPONINI\" in the last 63250 hours.  No results for input(s): \"BNP\", \"NTBNPI\", \"NTBNP\" in the last 51577 hours.  No results for input(s): \"TSH\" in the last 32900 hours.  No results for input(s): \"INR\" in the last 38426 hours.     Reviewed lab from [facility]     Jayden Mcnamara, DO    Patient require longitudinal care for his mechanical aortic valve, frequent INR checks.                         "

## 2024-06-11 NOTE — LETTER
6/11/2024    Sagar Pereira MD  [facility] [address] [place] [id number]    RE: Kory Guerrero       Dear Colleague,     I had the pleasure of seeing Kory Guerrero [facility].    HEART CARE ENCOUNTER CONSULTATON NOTE      [facility]  [phone]      Assessment/Recommendations   Assessment:   Severe aortic valve regurgitation status post 25 mm On-X Ascending Aortic mechanical aortic valve prosthesis (20-FEB-2024).   2.  Ascending aortic aneurysm status post ascending aorta replacement and hemiarch repair ([place]).     3.  Cardiac Arrest 2/23 (POD#2), [place], [place] in setting of severe hypokalemia.    4.  Atrial fibrillation and atrial flutter   5.  Biventricular heart failure with mild LV systolic dysfunction and LV enlargement   6.  Ventricular tachycardia   7.  Chronic anticoagulation with warfarin.  Recent INR < 2.0 (warfarin adjustment made).     Plan:   Increase lisinopril to 20 mg daily, continue to titrate as tolerated  2.   Repeat echo for left ventricular systolic dysfunction and left ventricular enlargement  3.  Continue anticoagulation mechanical aortic valve.  INR goal could change after 3 months to 1.8-2.2 if he has home monitoring given On-X valve   4.  Continue metoprolol XL 50 mg daily  5.  Continue exercise routine  6.  Will require prophylactic antibiotic therapy prior to dental cleaning, amoxicillin 2000 mg       History of Present Illness/Subjective    HPI: Kory Guerrero is a 64 year old male with history of aortic aneurysm and severe aortic insufficiency who presents to cardiology clinic at [facility] to self care with myself.    Patient was evaluated in 2023 for aortic valve replacement.  During his evaluation he was seen by Dr. Mckeon and was planning to proceed with surgery.  Patient later obtained a second opinion at [facility] and proceeded with surgical intervention at [facility].  He underwent successful hemiarch repair  and ascending aortic replacement along with replacement of his aortic valve with a On-x mechanical aortic valve prosthesis.  Unfortunately during his hospitalization he had a cardiac arrest resulting in CPR in the setting of ventricular tachycardia.  It was noted that he had severe hypokalemia at the time of his cardiac arrest and had just had his pacer wires removed.    Patient also had episodes of atrial fibrillation which he had experienced prior to surgery.  At the time of discharge she was discharged on Toprol XL and amiodarone.  After discharge she was seen in the emergency department for bradycardia and hypotension.  This was felt to be related to his amiodarone and beta-blocker therapy.  Amiodarone was discontinued and metoprolol was reduced.    Completed cardiac rehab.   Chest wall pain improving.      Echocardiogram Results: [place]    2. Severe LV enlargement with mildly increased wall thickness. Calculated 3D LVEF 45%.(Mildly reduced function). Indeterminate diastolic function.     3. Abnormal septal wall motion related to prior surgery.     4. Normal RV size and systolic function.     5. s/p 25 mm On-X mechanical bileaflet aortic valve prosthesis (2/20/2024). Mean systolic gradient 8 mmHg. Mild prosthetic and periprosthetic regurgitation.     6. s/p aortic aneurysm repair with 28 mm Hemashield (2/20/2024).     7. The IVC measures <2.1 cm with >50% collapse upon inspiration consistent with normal right atrial pressure, 3 mmHg.     8. There is a trivial pericardial effusion.     9. Compared to the prior study from 02/27/2024 at [facility], there have been no significant changes.     Coronary Angiogram: 10/9/2023  1.  Normal-appearing epicardial coronary arteries in a right dominant system.  2.  Aortic root and ascending aorta enlargement with moderate effacement of the sinotubular ridge.  3.  Elevated left ventricular end-diastolic pressure 27 mmHg  4.  Moderately severe to severe aortic regurgitation; no  aortic stenosis.  Aortic valve leaflets are noncalcified and appear thin and mobile.  5.  Left ventricle visualized via aortic root angio.  LV appears mildly enlarged with systolic function at the lower limit of normal.       Physical Examination  Review of Systems   Vitals: BP (!) 144/94 (BP Location: Right arm, Patient Position: Sitting, Cuff Size: Adult Regular)   Pulse 72   Resp 16   Ht 1.829 m (6')   Wt 83.3 kg (183 lb 11.2 oz)   BMI 24.91 kg/m    BMI= Body mass index is 24.91 kg/m .  Wt Readings from Last 3 Encounters:   06/11/24 83.3 kg (183 lb 11.2 oz)   04/03/24 82.6 kg (182 lb)   10/31/23 82.6 kg (182 lb)        Pleasant   ENT/Mouth: membranes moist, no oral lesions or bleeding gums.      EYES:  no scleral icterus, normal conjunctivae       Chest/Lungs:   lungs are clear to auscultation, no rales or wheezing, healing sternal scar, equal chest wall expansion    Cardiovascular:   Regular. Normal first and second heart sounds with mechanical click with no systolic murmurs, rubs, or gallops; the carotid, radial and posterior tibial pulses are intact, Jugular venous pressure normal, no edema bilaterally    Abdomen:  no tenderness; bowel sounds are present   Extremities: no cyanosis or clubbing   Skin: no xanthelasma, warm.    Neurologic: normal  bilateral, no tremors     Psychiatric: alert and oriented x3, calm        Please refer above for cardiac ROS details.        Medical History  Surgical History Family History Social History   Past Medical History:   Diagnosis Date    Aortic root dilatation (H24)     Aortic valve disorder 09/2023    Moderate to severe AI    Hypertension      Past Surgical History:   Procedure Laterality Date    CV AO ROOT N/A 10/9/2023    Procedure: Cardiac Aortic Root;  Surgeon: Edis Berrios MD;  Location: [facility] CATH [facility] CV    CV CORONARY ANGIOGRAM N/A 10/9/2023    Procedure: Coronary Angiogram;  Surgeon: Edis Berrios MD;  Location: [facility] CATH [facility] CV    CV LEFT  HEART CATH N/A 10/9/2023    Procedure: Left Heart Catheterization;  Surgeon: Edis Berrios MD;  Location: [facility] CV    TONSILLECTOMY, ADENOIDECTOMY, COMBINED       Family History   Problem Relation Age of Onset    Cerebrovascular Disease Mother 75        ischemic stroke    Cerebrovascular Disease Father 75        hemorrhagic    Valvular heart disease Maternal Grandmother         Social History     Socioeconomic History    Marital status:      Spouse name: Not on file    Number of children: Not on file    Years of education: Not on file    Highest education level: Not on file   Occupational History    Not on file   Tobacco Use    Smoking status: Never    Smokeless tobacco: Never   Vaping Use    Vaping status: Never Used   Substance and Sexual Activity    Alcohol use: Yes     Comment: occasional    Drug use: Never    Sexual activity: Not on file   Other Topics Concern    Not on file   Social History Narrative    Not on file     Social Determinants of Health     Financial Resource Strain: Low Risk  (11/8/2023)    Received from [facility]    Overall Financial Resource Strain (CARDIA)     Difficulty of Paying Living Expenses: Not hard at all   Food Insecurity: No Food Insecurity (2/25/2024)    Received from [facility]    Hunger Vital Sign     Worried About Running Out of Food in the Last Year: Never true     Ran Out of Food in the Last Year: Never true   Transportation Needs: No Transportation Needs (2/25/2024)    Received from [facility]    PRAPARE - Transportation     Lack of Transportation (Medical): No     Lack of Transportation (Non-Medical): No   Physical Activity: Sufficiently Active (11/8/2023)    Received from [facility]    Exercise Vital Sign     Days of Exercise per Week: 7 days     Minutes of Exercise per Session: 70 min   Stress: Not on file   Social Connections: Not on file   Interpersonal Safety: Not At Risk (2/25/2024)     "[facility]    Humiliation, Afraid, Rape, and Kick questionnaire     Fear of Current or Ex-Partner: No     Emotionally Abused: No     Physically Abused: No     Sexually Abused: No   Housing Stability: Low Risk  (2/25/2024)    [facility]    Housing Stability     What is your living situation today?: I have a steady place to live           Medications  Allergies   Current Outpatient Medications   Medication Sig Dispense Refill    aspirin (ASA) 81 MG chewable tablet Take 81 mg by mouth daily      lisinopril (ZESTRIL) 10 MG tablet Take 1 tablet (10 mg) by mouth daily 90 tablet 3    magnesium oxide 200 MG TABS Take 200 mg by mouth daily      metoprolol succinate ER (TOPROL XL) 50 MG 24 hr tablet Take 2 tablets by mouth daily      multivitamin, therapeutic (THERA-VIT) TABS tablet Take 1 tablet by mouth daily      warfarin ANTICOAGULANT (COUMADIN) 4 MG tablet Take 10 mg by mouth daily       No Known Allergies       Lab Results    Chemistry/lipid CBC Cardiac Enzymes/BNP/TSH/INR   Recent Labs   Lab Test 05/15/23  0914   TRIG 161     No results for input(s): \"LDL\" in the last 64744 hours.  Recent Labs   Lab Test 10/09/23  0752      POTASSIUM 3.6   CHLORIDE 104   CO2 25   *   BUN 13.2   CR 0.86   GFRESTIMATED >90   MARILUZ 8.7*     Recent Labs   Lab Test 10/09/23  0752   CR 0.86     No results for input(s): \"A1C\" in the last 69205 hours.       Recent Labs   Lab Test 10/09/23  0752   WBC 4.3   HGB 14.9   HCT 42.2   MCV 89        Recent Labs   Lab Test 10/09/23  0752   HGB 14.9    No results for input(s): \"TROPONINI\" in the last 84920 hours.  No results for input(s): \"BNP\", \"NTBNPI\", \"NTBNP\" in the last 35715 hours.  No results for input(s): \"TSH\" in the last 25419 hours.  No results for input(s): \"INR\" in the last 99404 hours.     Reviewed lab from [facility]     Jayden Mcnamara, DO    Patient require longitudinal care for his mechanical aortic valve, " frequent INR checks.    Thank you for allowing me to participate in the care of your patient.      Sincerely,     Jayden Mcnamara DO   [facility]  cc:   Jayden Mcnamara DO  [address] [place] [address]

## 2024-06-12 ENCOUNTER — TRANSFERRED RECORDS (OUTPATIENT)
Dept: HEALTH INFORMATION MANAGEMENT | Facility: CLINIC | Age: 64
End: 2024-06-12

## 2024-06-24 ENCOUNTER — ANCILLARY PROCEDURE (OUTPATIENT)
Dept: CARDIOLOGY | Facility: CLINIC | Age: 64
End: 2024-06-24
Attending: INTERNAL MEDICINE
Payer: COMMERCIAL

## 2024-06-24 DIAGNOSIS — Z95.2 H/O MECHANICAL AORTIC VALVE REPLACEMENT: ICD-10-CM

## 2024-06-24 DIAGNOSIS — I50.20: ICD-10-CM

## 2024-06-24 DIAGNOSIS — I10 BENIGN ESSENTIAL HYPERTENSION: ICD-10-CM

## 2024-06-24 DIAGNOSIS — Z95.828 H/O ASCENDING AORTIC REPLACEMENT: ICD-10-CM

## 2024-06-24 DIAGNOSIS — I51.7 LEFT VENTRICULAR ENLARGEMENT: ICD-10-CM

## 2024-06-24 LAB — LVEF ECHO: NORMAL

## 2024-06-24 PROCEDURE — 93306 TTE W/DOPPLER COMPLETE: CPT | Performed by: INTERNAL MEDICINE

## 2024-07-11 ENCOUNTER — MYC MEDICAL ADVICE (OUTPATIENT)
Dept: CARDIOLOGY | Facility: CLINIC | Age: 64
End: 2024-07-11
Payer: COMMERCIAL

## 2024-07-11 DIAGNOSIS — I10 BENIGN ESSENTIAL HYPERTENSION: Primary | ICD-10-CM

## 2024-07-11 RX ORDER — METOPROLOL SUCCINATE 50 MG/1
100 TABLET, EXTENDED RELEASE ORAL DAILY
Qty: 180 TABLET | Refills: 3 | Status: SHIPPED | OUTPATIENT
Start: 2024-07-11 | End: 2024-09-05

## 2024-07-11 RX ORDER — METOPROLOL SUCCINATE 50 MG/1
TABLET, EXTENDED RELEASE ORAL
Qty: 90 TABLET | Refills: 0 | OUTPATIENT
Start: 2024-07-11

## 2024-07-15 ENCOUNTER — TELEPHONE (OUTPATIENT)
Dept: CARDIOLOGY | Facility: CLINIC | Age: 64
End: 2024-07-15
Payer: COMMERCIAL

## 2024-07-15 DIAGNOSIS — Z95.2 H/O MECHANICAL AORTIC VALVE REPLACEMENT: Primary | ICD-10-CM

## 2024-07-15 DIAGNOSIS — I10 BENIGN ESSENTIAL HYPERTENSION: ICD-10-CM

## 2024-07-15 DIAGNOSIS — I25.84 CORONARY ARTERY CALCIFICATION OF NATIVE ARTERY: ICD-10-CM

## 2024-07-15 DIAGNOSIS — I25.10 CORONARY ARTERY CALCIFICATION OF NATIVE ARTERY: ICD-10-CM

## 2024-07-15 NOTE — TELEPHONE ENCOUNTER
[place] reviewed.  Patients recent blood pressure is outside of guideline parameters.  Called pt to review, no answer.  Left voicemail message asking patient to check their blood pressure using a home blood pressure cuff or by going to a [facility].  Patient instructed to then call [phone] ([facility]) and leave a message with their name, date of birth, and blood pressure reading that was completed within the last 24 hours and where it was completed.  Will await call back for further review.

## 2024-07-16 NOTE — TELEPHONE ENCOUNTER
Patient returned call and left voicemail message with update blood pressure reading.      Last Blood Pressure: 144/94  Last Heart Rate: 72  Date: 06/11/24  Location: [facility]    Blood Pressures from the last 24 hrs: 140/104, 162/108, 157/107  BP 2-3 hrs after taking medications: 125/93  Heart Rates from the last 24 hrs: 85, 65, 85  Location: Home BP    Patient reported blood pressure updated in Epic. Blood pressure continues to fall outside of the [place] Community Measures guidelines.  Message sent to primary cardiology team for further review.

## 2024-07-17 NOTE — TELEPHONE ENCOUNTER
"Dr. Mcnamara, please review. /92 post-medications on Lisinopril 20 mg and Metoprolol 100 mg daily. Any medications adjustments? Ok to trial Metoprolol in divided doses 50 mg BID? Thank you CMM,Rn   ____________________________________________________________________________    PC with patient and review.   Lisinopril increased to 20 mg on 6/11 taking. Metoprolol (2) 50 mg Toprol XL tabs, taking 100 mg each morning. Discussion with CNP at [facility] recommended to take 50 mg twice daily to see if would help BP. Denies use of NSAIDS, decongestants, stress, pain, increased salt intake or new prescription medications. Denies headaches. Has had a couple episodes of \"kaleidoscope ring) in his vision, lasting a couple of hours. Resolved on own. Last episode this past Sunday, possibly due to higher sodium content eating out the evening prior. Not returned. Does watch his sodium intake day-to-day. At home blood pressure cuff approximately 1 year old. Feels the best when he is active or exercises. Denies MCGEE.   BP this am pre-medications 150/100, post medications 128/92. Will update provider, and seek advisement. Return call once obtained. ZOE MORA   "

## 2024-07-18 ENCOUNTER — MYC MEDICAL ADVICE (OUTPATIENT)
Dept: CARDIOLOGY | Facility: CLINIC | Age: 64
End: 2024-07-18
Payer: COMMERCIAL

## 2024-07-19 ENCOUNTER — MYC MEDICAL ADVICE (OUTPATIENT)
Dept: CARDIOLOGY | Facility: CLINIC | Age: 64
End: 2024-07-19
Payer: COMMERCIAL

## 2024-07-19 RX ORDER — HYDROCHLOROTHIAZIDE 12.5 MG/1
12.5 TABLET ORAL DAILY
Qty: 30 TABLET | Refills: 2 | Status: SHIPPED | OUTPATIENT
Start: 2024-07-19 | End: 2024-08-12

## 2024-07-19 NOTE — TELEPHONE ENCOUNTER
===View-only below this line===  ----- Message -----  From: Jayden Mcnamara DO  Sent: 7/19/2024   9:16 AM CDT  To: Orlando Cloud RN    I would switch patient to lisinopril 20 with 12.5 mg of hydrochlorothiazide.  Check BMP ion 2 weeks.  If patient is noticing fever or chill should be seen by PCP.

## 2024-07-19 NOTE — TELEPHONE ENCOUNTER
PC with patient. Review of recommendation. Agreeable to medication addition. Declined combo drug at this time. Trial on separate pill to see if tolerated. 30 day supply with a couple of refills started. Lab order. Faxed to [facility]. Pt will make lab appt. Requested recommendations via [facility] also. [facility] sent. No further questions. Encouraged proper hydration. MORGAN,Rn

## 2024-08-05 VITALS — DIASTOLIC BLOOD PRESSURE: 97 MMHG | SYSTOLIC BLOOD PRESSURE: 135 MMHG

## 2024-08-05 NOTE — TELEPHONE ENCOUNTER
Patient returned call and left voicemail message with update blood pressure reading.      Last Blood Pressure: 144/94  Last Heart Rate: 72  Date: 06/11/24  Location: [facility]    Today's Blood Pressure: 135/97  Location: Home BP    Patient reported blood pressure updated in Epic. Blood pressure continues to fall outside of the [place] Community Measures guidelines.  Message sent to primary cardiology team for further review.

## 2024-08-07 ENCOUNTER — TRANSFERRED RECORDS (OUTPATIENT)
Dept: HEALTH INFORMATION MANAGEMENT | Facility: CLINIC | Age: 64
End: 2024-08-07

## 2024-08-07 LAB — INR (EXTERNAL): 1.67 (ref 0.9–1.11)

## 2024-08-12 ENCOUNTER — MYC REFILL (OUTPATIENT)
Dept: CARDIOLOGY | Facility: CLINIC | Age: 64
End: 2024-08-12
Payer: COMMERCIAL

## 2024-08-12 DIAGNOSIS — I10 BENIGN ESSENTIAL HYPERTENSION: ICD-10-CM

## 2024-08-12 RX ORDER — AMOXICILLIN 500 MG/1
CAPSULE ORAL
Qty: 4 CAPSULE | Refills: 0 | Status: SHIPPED | OUTPATIENT
Start: 2024-08-12

## 2024-08-12 NOTE — TELEPHONE ENCOUNTER
===View-only below this line===  ----- Message -----  From: Jayden Mcnamara DO  Sent: 8/12/2024   2:37 PM CDT  To: Orlando Cloud RN    Labs were reviewed from physicians.  Would continue with hydrochlorothiazide and lisinopril.  Would recommend patient monitors low-sodium diet and continue his exercise.  Update in 2 weeks were blood pressure readings are.  Recommend patient obtains morning blood pressure readings after 5 minutes of rest.  May need further titration of medications.

## 2024-08-12 NOTE — TELEPHONE ENCOUNTER
Dr Cuevas, Please review labs. Current BP readings at home, any further med adjustments? Lisinopril and hydrochlorothiazide are separate pills. Thank you CMM,Rn   __________________________________________________________    Incoming call from patient to inform of labs completed. Lab work is available and cc'd to MAT from [facility]. OF note all WNL except Bun 22.   Update patient is taking hydrochlorothiazide 12.5 mg with Lisinopril as two separate medications. Current reported readings, 140-150/100 before or after meds, at different intervals. Exercise blood pressure 130/95-97 improvement. More exercise appears to be better. On days that he isn't active, BP is elevated. Only correlation drawn during this time. Denies side effects from the hydrochlorothiazide. Will update MAT and seek further recs, and return call. Of note pt reports he was on Amlodipine 10 mg previously and Bisoprolol (unsure if correct drug) for BP management. Informed patient that Bisoprolol not really prescribed by our providers, but will add to documentation for awareness. Unable to discover previous prescription. ZOE MORA

## 2024-08-14 RX ORDER — HYDROCHLOROTHIAZIDE 12.5 MG/1
12.5 TABLET ORAL DAILY
Qty: 30 TABLET | Refills: 2 | Status: SHIPPED | OUTPATIENT
Start: 2024-08-14 | End: 2024-09-18

## 2024-08-14 NOTE — TELEPHONE ENCOUNTER
PC with patient and review. Discussion of provider response. Encouraged to take morning meds, 2-3 hours later, before next meal/eating. Check BP. Sit quietly for 10 minutes prior to checking BP. Disccussion of sodium in foods, and attempts to reduce sodium. Pt eats veery healthy, and physically active 1-2 times per day. Will continue medications and to monitor BP readings. Report in 2 weeks time. Has direct office number to call sooner to report any new concerns. MORGAN,Rn

## 2024-09-04 ENCOUNTER — MYC MEDICAL ADVICE (OUTPATIENT)
Dept: CARDIOLOGY | Facility: CLINIC | Age: 64
End: 2024-09-04
Payer: COMMERCIAL

## 2024-09-04 DIAGNOSIS — I10 BENIGN ESSENTIAL HYPERTENSION: Primary | ICD-10-CM

## 2024-09-05 RX ORDER — BISOPROLOL FUMARATE 5 MG/1
5 TABLET, FILM COATED ORAL DAILY
Qty: 30 TABLET | Refills: 3 | Status: SHIPPED | OUTPATIENT
Start: 2024-09-05

## 2024-09-05 NOTE — TELEPHONE ENCOUNTER
=View-only below this line===  ----- Message -----  From: Jayden Mcnamara DO  Sent: 9/5/2024   8:11 AM CDT  To: Orlando Cloud RN  Subject: FW: Blood pressure followup                      Blood pressure remains very high.    Recommendation:   1. Start amlodipine 5 mg daily (will likely need to titrate to 10 mg daily).   2. Change metoprolol to bisoprolol 5 mg daily   3.  Renal artery doppler flow (evaluated for renal artery stenosis).   4. Make sure patient is not using NSAIDs.   5. Need to do low intensity exercise (walking) 3-5 tie per week for 30 minutes.     Thanks,     Joe    PC to patient no answer and LVM. Informing of MCM. Call back clinic at [phone] if unable to view or have questions. ZOE MORA

## 2024-09-05 NOTE — TELEPHONE ENCOUNTER
Dr. Mcnamara please review. Pt requests alternate medication due to informed of amlodipine causing aortic root enlargement at time of [facility] at [place]. Alt rec? Thank you CMM,Rn   ___________________________________________________________    PC with patient. Review of recommendations. Agreeable to all except amlodipine. Recalls that the [place] [facility] surgeon removed Amlodipine and stated that there was research that suggests it contributes to aortic root enlargement. Request MAT to review, and seek alternate medication. MCM of all recommendations sent. Bisoprolol medication Rx sent. Toprol, discontinued. Renal US order placed. Will route to MAT and return call to discuss further recs. Verbalized undersatnding. CMM,Rn

## 2024-09-09 RX ORDER — LISINOPRIL 40 MG/1
40 TABLET ORAL DAILY
Qty: 90 TABLET | Refills: 1 | Status: SHIPPED | OUTPATIENT
Start: 2024-09-09

## 2024-09-09 NOTE — TELEPHONE ENCOUNTER
PC to patient, and review. Informed of [facility] for viewing also. Will increase his lisinopril starting tomorrow, taking two. No further questions. MORGAN,RN

## 2024-09-09 NOTE — TELEPHONE ENCOUNTER
===View-only below this line===  ----- Message -----  From: Jayden Mcnamara DO  Sent: 9/6/2024   7:54 PM CDT  To: Orlando Cloud RN  Subject: FW: Blood pressure followup                      Have the patient increase lisinopril to 40 mg daily.  Do not start amlodipine   SUBJECTIVE:  The patient twisted her ankle and foot stepping on a rock under the snow subsequently falling but no other injury other than ankle and foot and she did not think much of it until over the last day and half for so there has been more swelling of the foot and more pain.  The injury happened approximately 2 and half to 3 days ago.  She complains of pain in the foot dorsal over the 2nd 3rd metatarsal region and also lateral malleolar region.    OBJECTIVE: Examination of the patient reveals:   Visit Vitals  /80 (BP Location: LUE - Left upper extremity, Patient Position: Sitting, Cuff Size: Regular)   Pulse 92   Temp 98.1 °F (36.7 °C) (Oral)   Resp 16   Wt (!) 94.3 kg   SpO2 99%     GENERAL: appears stated age, well developed and well nourished, in no distress and normal affect    Examination of the foot reveals mild swelling but no erythema and no ecchymosis.  There is fairly good range of motion of the ankle but moderate discomfort to palpation of the lateral malleolar region more so in the anterior fibulotalar ligament region.  There is also discomfort to palpation of the 3rd metatarsal mid portion of the metatarsal and x-rays were done foot and ankle and interpreted by radiology as normal.    ASSESSMENT: Sprain of anterior talofibular ligament of right ankle, initial encounter  (primary encounter diagnosis)  Comment:  Recommended begin active range of motion exercises after about a week or so and in the meantime wear a air cast to protect the ankle especially when out and about  Plan: XR FOOT 3+ VW RIGHT, XR ANKLE 3+ VW RIGHT,         AIRFORM UNIVERSAL INFLATABLE ANKLE STIRRUP OFF         THE SHELF        Follow-up if not better over the course of the next couple of weeks.      Return visit/disposition noted below

## 2024-09-17 ENCOUNTER — MYC MEDICAL ADVICE (OUTPATIENT)
Dept: CARDIOLOGY | Facility: CLINIC | Age: 64
End: 2024-09-17
Payer: COMMERCIAL

## 2024-09-17 ENCOUNTER — HOSPITAL ENCOUNTER (OUTPATIENT)
Dept: ULTRASOUND IMAGING | Facility: CLINIC | Age: 64
Discharge: HOME | End: 2024-09-17
Attending: INTERNAL MEDICINE | Admitting: INTERNAL MEDICINE
Payer: MEDICARE

## 2024-09-17 DIAGNOSIS — I10 BENIGN ESSENTIAL HYPERTENSION: ICD-10-CM

## 2024-09-17 PROCEDURE — 93975 VASCULAR STUDY: CPT

## 2024-09-18 ENCOUNTER — MYC MEDICAL ADVICE (OUTPATIENT)
Dept: CARDIOLOGY | Facility: CLINIC | Age: 64
End: 2024-09-18
Payer: COMMERCIAL

## 2024-09-18 RX ORDER — HYDROCHLOROTHIAZIDE 25 MG/1
25 TABLET ORAL DAILY
Qty: 90 TABLET | Refills: 1 | Status: SHIPPED | OUTPATIENT
Start: 2024-09-18

## 2024-09-18 NOTE — TELEPHONE ENCOUNTER
Jayden Mcnamara, DO   to [facility] Cv Rn Team Z       9/17/24  7:58 PM  Have patient increase hydrochlorothiazide to 25 mg daily.  Check BMP in 10 days.  IF BP remains elevated would add spirolactone as next medication.        ==msg to patient; orders placed. Msg to schedulers to arrange BMP in 10 days. -[facility] [place]

## 2024-09-26 NOTE — TELEPHONE ENCOUNTER
Called patient to review recent elevated blood pressure reading.  Per [place] Community Measures guidelines, patients blood pressure is out of parameters and recheck blood pressure is recommended.    Last Blood Pressure: 135/97  Last Heart Rate: N/A  Date: 8/5/2024  Location: Home BP    Patient stated he is communicating via [employer] and phone calls with Dr. Mcnamara regarding his blood pressure and has recently had his hydrochlorothiazide increased from 12.5 mg to 25 mg. Rather than myself calling the patient again he stated he will continue to communicate with Dr. Mcnamara with his blood pressure readings.    Dayna Calderón, CMA

## 2024-10-07 ENCOUNTER — TRANSFERRED RECORDS (OUTPATIENT)
Dept: HEALTH INFORMATION MANAGEMENT | Facility: CLINIC | Age: 64
End: 2024-10-07
Payer: COMMERCIAL

## 2024-10-23 ENCOUNTER — TELEPHONE (OUTPATIENT)
Dept: CARDIOLOGY | Facility: CLINIC | Age: 64
End: 2024-10-23
Payer: COMMERCIAL

## 2024-10-23 LAB
CREATININE (EXTERNAL): 1 MG/DL (ref 0.7–1.4)
GLUCOSE (EXTERNAL): 109 MG/DL (ref 60–99)
POTASSIUM (EXTERNAL): 4.2 MMOL/L (ref 3.5–5.1)

## 2024-10-23 NOTE — TELEPHONE ENCOUNTER
[facility] Call Center    Phone Message    May a detailed message be left on voicemail: yes     Reason for Call: Other: Pt is requesting that his BMP lab order be faxed to [facility] this morning.  He is there now and would like to have it done this morning.  Please inform pt when it is faxed.     Action Taken: Other: cardio    Travel Screening: Not Applicable    Thank you!  [facility]       Date of Service:

## 2024-10-30 ENCOUNTER — MYC MEDICAL ADVICE (OUTPATIENT)
Dept: CARDIOLOGY | Facility: CLINIC | Age: 64
End: 2024-10-30
Payer: COMMERCIAL

## 2024-10-30 NOTE — TELEPHONE ENCOUNTER
Hi Team,    Can someone please help offer this patient an appointment with Dr. Mcnamara on 11/6 at 10:20am, at [facility]?    Slot is being held for the patient!    Thanks,  Trinidad Mcnamara, Jayden Feliz, DO  You; Gene Chahaln2 hours ago (10:25 AM)       Can we setup a clinic slot to meet with patient in clinic.  Would recommend patient stays on current medication until follow-up.      Judy- Could you add him to a clinic slot at [facility] on Nov 6 or 7 in late morning.      Thanks,    Joe

## 2024-11-06 ENCOUNTER — OFFICE VISIT (OUTPATIENT)
Dept: CARDIOLOGY | Facility: CLINIC | Age: 64
End: 2024-11-06
Payer: COMMERCIAL

## 2024-11-06 VITALS
HEIGHT: 72 IN | BODY MASS INDEX: 24.62 KG/M2 | DIASTOLIC BLOOD PRESSURE: 80 MMHG | WEIGHT: 181.8 LBS | HEART RATE: 57 BPM | OXYGEN SATURATION: 98 % | RESPIRATION RATE: 16 BRPM | SYSTOLIC BLOOD PRESSURE: 134 MMHG

## 2024-11-06 DIAGNOSIS — Z95.2 H/O MECHANICAL AORTIC VALVE REPLACEMENT: ICD-10-CM

## 2024-11-06 DIAGNOSIS — I15.2 HYPERTENSION DUE TO ENDOCRINE DISORDER: ICD-10-CM

## 2024-11-06 DIAGNOSIS — I77.89 AORTIC ROOT ENLARGEMENT (H): ICD-10-CM

## 2024-11-06 DIAGNOSIS — Z95.828 H/O ASCENDING AORTIC REPLACEMENT: ICD-10-CM

## 2024-11-06 DIAGNOSIS — I10 BENIGN ESSENTIAL HYPERTENSION: ICD-10-CM

## 2024-11-06 DIAGNOSIS — I51.7 LEFT VENTRICULAR ENLARGEMENT: ICD-10-CM

## 2024-11-06 DIAGNOSIS — E27.8 ADRENAL MASS (H): Primary | ICD-10-CM

## 2024-11-06 PROCEDURE — G2211 COMPLEX E/M VISIT ADD ON: HCPCS | Performed by: INTERNAL MEDICINE

## 2024-11-06 PROCEDURE — 99215 OFFICE O/P EST HI 40 MIN: CPT | Performed by: INTERNAL MEDICINE

## 2024-11-06 RX ORDER — LOSARTAN POTASSIUM 50 MG/1
50 TABLET ORAL 2 TIMES DAILY
Qty: 60 TABLET | Refills: 3 | Status: SHIPPED | OUTPATIENT
Start: 2024-11-06

## 2024-11-06 NOTE — PATIENT INSTRUCTIONS
Please contact direct nurses line Monday through Friday 8 AM to 5 PM @ [phone]    After-hours contact cardiology office at [phone].    Plan:    Stop lisinopril and start losartan 50 mg twice daily  Will check aldosterone levels given adrenal mass and high blood pressure.    Would consider further changes if needed.

## 2024-11-06 NOTE — LETTER
11/6/2024    Sagar Pereira MD  [facility] [address] [place] [id number]    RE: Kory Guerrero       Dear Colleague,     I had the pleasure of seeing Kory Guerrero in the [facility]alth [facility].    HEART CARE ENCOUNTER CONSULTATON NOTE      [facility]  [phone]      Assessment/Recommendations   Assessment:   Severe aortic valve regurgitation status post 25 mm On-X Ascending Aortic mechanical aortic valve prosthesis (20-FEB-2024).   2.  Ascending aortic aneurysm status post ascending aorta replacement and hemiarch repair ([place]).     3.  Cardiac Arrest 2/23 (POD#2), [place], [place] in setting of severe hypokalemia.    4.  Atrial fibrillation and atrial flutter   5.  Biventricular heart failure with mild LV systolic dysfunction and LV enlargement   6.  Ventricular tachycardia   7.  Chronic anticoagulation with warfarin.   INR (External)   Date Value Ref Range Status   08/07/2024 1.67 (A) 0.90 - 1.11 Final   8.  Adrenal mass noted at [facility], was not evaluated    Plan:   Extensive conversation with the patient regarding concern for side effects to medications.    Cough related to lisinopril.  Agree likely side effect of medication will switch over to losartan 50 mg twice daily.  Will convert to daily if tolerated.    Concerned about photosensitivity related to his hydrochlorothiazide.  This is a possible side effect.  Will consider changing to alternative blood pressure medication as we make further adjustments  Concerned about anxiety related to his use of his beta-blocker.  It is possible this could worsen anxiety being on bisoprolol.  Given blood pressure issues may decide to switch over to carvedilol given alpha-blocker properties of medication as well.  Discussed other hypertensive agents such as alpha blockers, hydralazine and clonidine.  Again patient does not want to go back on calcium channel blockers as he was advised by [facility] thoracic surgical team not to use  beta-blockers in the setting of aortic enlargement.     5.  Continue anticoagulation mechanical aortic valve.  INR goal 1.8-2.2 if he has home monitoring given On-X valve   6.  Continue exercise routine  7   Require prophylactic antibiotic therapy prior to dental cleaning, amoxicillin 2000 mg  8.  Screen for secondary causes of hypertension including hyperaldosterone levels.        History of Present Illness/Subjective    HPI: Kory Guerrero is a 64 year old male with history of aortic aneurysm and severe aortic insufficiency status post aortic valve replacement mechanical aortic valve complicated by cardiac arrest during hospitalization at [facility] who presents to cardiology clinic in follow-up.    Patient had an excellent recovery from his cardiothoracic surgery.  He denies any active chest pain or dyspnea on exertion.  He remains active and exercising.  Patient has concerns about side effects and intolerance to medications.  We spent extensive amount of time addressing each medication in detail.  We discussed options and alternatives to current medications.  He is currently on a low-sodium diet, exercising, weight is good.  Will screen for secondary causes.  He does have a adrenal mass which probably should be further evaluated and detected at [facility] but no workup was pursued.    Echocardiogram Results: [place]    2. Severe LV enlargement with mildly increased wall thickness. Calculated 3D LVEF 45%.(Mildly reduced function). Indeterminate diastolic function.     3. Abnormal septal wall motion related to prior surgery.     4. Normal RV size and systolic function.     5. s/p 25 mm On-X mechanical bileaflet aortic valve prosthesis (2/20/2024). Mean systolic gradient 8 mmHg. Mild prosthetic and periprosthetic regurgitation.     6. s/p aortic aneurysm repair with 28 mm Hemashield (2/20/2024).     7. The IVC measures <2.1 cm with >50% collapse upon inspiration consistent with normal right atrial pressure, 3 mmHg.      8. There is a trivial pericardial effusion.     9. Compared to the prior study from 02/27/2024 at [facility], there have been no significant changes.     Coronary Angiogram: 10/9/2023  1.  Normal-appearing epicardial coronary arteries in a right dominant system.  2.  Aortic root and ascending aorta enlargement with moderate effacement of the sinotubular ridge.  3.  Elevated left ventricular end-diastolic pressure 27 mmHg  4.  Moderately severe to severe aortic regurgitation; no aortic stenosis.  Aortic valve leaflets are noncalcified and appear thin and mobile.  5.  Left ventricle visualized via aortic root angio.  LV appears mildly enlarged with systolic function at the lower limit of normal.       Physical Examination  Review of Systems   Vitals: /80 (BP Location: Right arm, Patient Position: Sitting, Cuff Size: Adult Regular)   Pulse 57   Resp 16   Ht 1.829 m (6')   Wt 82.5 kg (181 lb 12.8 oz)   SpO2 98%   BMI 24.66 kg/m    BMI= Body mass index is 24.66 kg/m .  Wt Readings from Last 3 Encounters:   11/06/24 82.5 kg (181 lb 12.8 oz)   06/11/24 83.3 kg (183 lb 11.2 oz)   04/03/24 82.6 kg (182 lb)        Pleasant   ENT/Mouth: membranes moist, no oral lesions or bleeding gums.      EYES:  no scleral icterus, normal conjunctivae       Chest/Lungs:   lungs are clear to auscultation   Cardiovascular:   Regular. Normal first and second heart sounds with mechanical click. No edema bilaterally    Abdomen:  no tenderness; bowel sounds are present   Extremities: no cyanosis or clubbing   Skin: no xanthelasma, warm.    Neurologic: normal  bilateral, no tremors     Psychiatric: alert and oriented x3, calm        Please refer above for cardiac ROS details.        Medical History  Surgical History Family History Social History   Past Medical History:   Diagnosis Date     Aortic root dilatation (H)      Aortic valve disorder 09/2023    Moderate to severe AI     Hypertension      Past Surgical History:    Procedure Laterality Date     CV AO ROOT N/A 10/9/2023    Procedure: Cardiac Aortic Root;  Surgeon: Edis Berrios MD;  Location: [facility] CATH LAB CV     CV CORONARY ANGIOGRAM N/A 10/9/2023    Procedure: Coronary Angiogram;  Surgeon: Edis Berrios MD;  Location: [facility] CATH LAB CV     CV LEFT HEART CATH N/A 10/9/2023    Procedure: Left Heart Catheterization;  Surgeon: Edis Berrios MD;  Location: [facility] CATH LAB CV     TONSILLECTOMY, ADENOIDECTOMY, COMBINED       Family History   Problem Relation Age of Onset     Cerebrovascular Disease Mother 75        ischemic stroke     Cerebrovascular Disease Father 75        hemorrhagic     Valvular heart disease Maternal Grandmother         Social History     Socioeconomic History     Marital status:      Spouse name: Not on file     Number of children: Not on file     Years of education: Not on file     Highest education level: Not on file   Occupational History     Not on file   Tobacco Use     Smoking status: Never     Smokeless tobacco: Never   Vaping Use     Vaping status: Never Used   Substance and Sexual Activity     Alcohol use: Yes     Comment: occasional     Drug use: Never     Sexual activity: Not on file   Other Topics Concern     Not on file   Social History Narrative     Not on file     Social Drivers of Health     Financial Resource Strain: Low Risk  (11/8/2023)    Received from [facility]    Overall Financial Resource Strain (CARDIA)      Difficulty of Paying Living Expenses: Not hard at all   Food Insecurity: No Food Insecurity (2/25/2024)    Received from [facility]    Hunger Vital Sign      Worried About Running Out of Food in the Last Year: Never true      Ran Out of Food in the Last Year: Never true   Transportation Needs: No Transportation Needs (2/25/2024)    Received from [facility]    PRAPARE - Transportation      Lack of Transportation (Medical): No      Lack of Transportation  "(Non-Medical): No   Physical Activity: Sufficiently Active (11/8/2023)    Received from [facility]    Exercise Vital Sign      Days of Exercise per Week: 7 days      Minutes of Exercise per Session: 70 min   Stress: Not on file   Social Connections: Not on file   Interpersonal Safety: Not At Risk (2/25/2024)    Received from [facility]    Humiliation, Afraid, Rape, and Kick questionnaire      Fear of Current or Ex-Partner: No      Emotionally Abused: No      Physically Abused: No      Sexually Abused: No   Housing Stability: Low Risk  (2/25/2024)    Received from [facility]    Housing Stability      What is your living situation today?: I have a steady place to live           Medications  Allergies   Current Outpatient Medications   Medication Sig Dispense Refill     amoxicillin (AMOXIL) 500 MG capsule Take 2,000 mg (4 tabs) by mouth once, 30-60 minutes prior to dental work or cleaning. 4 capsule 0     aspirin (ASA) 81 MG chewable tablet Take 81 mg by mouth daily       bisoprolol (ZEBETA) 5 MG tablet Take 1 tablet (5 mg) by mouth daily. 30 tablet 3     hydroCHLOROthiazide (HYDRODIURIL) 25 MG tablet Take 1 tablet (25 mg) by mouth daily. 90 tablet 1     lisinopril (ZESTRIL) 40 MG tablet Take 1 tablet (40 mg) by mouth daily. 90 tablet 1     magnesium oxide 200 MG TABS Take 200 mg by mouth daily       multivitamin, therapeutic (THERA-VIT) TABS tablet Take 1 tablet by mouth daily       warfarin ANTICOAGULANT (COUMADIN) 4 MG tablet Take 8 mg by mouth daily.         Allergies   Allergen Reactions     Grass Pollen(K-O-R-T-Swt Gaurav) Other (See Comments)     other          Lab Results    Chemistry/lipid CBC Cardiac Enzymes/BNP/TSH/INR   Recent Labs   Lab Test 05/15/23  0914   TRIG 161     No results for input(s): \"LDL\" in the last 19913 hours.  Recent Labs   Lab Test 10/09/23  0752      POTASSIUM 3.6   CHLORIDE 104   CO2 25   *   BUN 13.2   CR 0.86   GFRESTIMATED >90 " "  MARILUZ 8.7*     Recent Labs   Lab Test 10/09/23  0752   CR 0.86     No results for input(s): \"A1C\" in the last 35831 hours.    Last Comprehensive Metabolic Panel:  Lab Results   Component Value Date     10/09/2023    POTASSIUM 3.6 10/09/2023    CHLORIDE 104 10/09/2023    CO2 25 10/09/2023    ANIONGAP 11 10/09/2023     (H) 10/09/2023    BUN 13.2 10/09/2023    CR 0.86 10/09/2023    GFRESTIMATED >90 10/09/2023    MARILUZ 8.7 (L) 10/09/2023      Recent Labs   Lab Test 10/09/23  0752   WBC 4.3   HGB 14.9   HCT 42.2   MCV 89        Recent Labs   Lab Test 10/09/23  0752   HGB 14.9    No results for input(s): \"TROPONINI\" in the last 88164 hours.  No results for input(s): \"BNP\", \"NTBNPI\", \"NTBNP\" in the last 78130 hours.  No results for input(s): \"TSH\" in the last 29167 hours.  No results for input(s): \"INR\" in the last 77313 hours.       Jayden Mcnamara DO      Today's clinic visit entailed:  42 minutes spent by me on the date of the encounter doing chart review, history and exam, documentation and further activities per the note    The longitudinal plan of care for the diagnosis(es)/condition(s) as documented were addressed during this visit. Due to the added complexity in care, I will continue to support Kory in the subsequent management and with ongoing continuity of care.                       Thank you for allowing me to participate in the care of your patient.      Sincerely,     Jayden Mcnamara DO     [facility]  cc:   Jayden Mcnamara DO  [address] [place] [address]      "

## 2024-11-06 NOTE — PROGRESS NOTES
HEART CARE ENCOUNTER CONSULTATON NOTE      [facility]  [phone]      Assessment/Recommendations   Assessment:   Severe aortic valve regurgitation status post 25 mm On-X Ascending Aortic mechanical aortic valve prosthesis (20-FEB-2024).   2.  Ascending aortic aneurysm status post ascending aorta replacement and hemiarch repair ([place]).     3.  Cardiac Arrest 2/23 (POD#2), [place] in setting of severe hypokalemia.    4.  Atrial fibrillation and atrial flutter   5.  Biventricular heart failure with mild LV systolic dysfunction and LV enlargement   6.  Ventricular tachycardia   7.  Chronic anticoagulation with warfarin.   INR (External)   Date Value Ref Range Status   08/07/2024 1.67 (A) 0.90 - 1.11 Final   8.  Adrenal mass noted at [facility], was not evaluated    Plan:   Extensive conversation with the patient regarding concern for side effects to medications.    Cough related to lisinopril.  Agree likely side effect of medication will switch over to losartan 50 mg twice daily.  Will convert to daily if tolerated.    Concerned about photosensitivity related to his hydrochlorothiazide.  This is a possible side effect.  Will consider changing to alternative blood pressure medication as we make further adjustments  Concerned about anxiety related to his use of his beta-blocker.  It is possible this could worsen anxiety being on bisoprolol.  Given blood pressure issues may decide to switch over to carvedilol given alpha-blocker properties of medication as well.  Discussed other hypertensive agents such as alpha blockers, hydralazine and clonidine.  Again patient does not want to go back on calcium channel blockers as he was advised by [facility] thoracic surgical team not to use beta-blockers in the setting of aortic enlargement.     5.  Continue anticoagulation mechanical aortic valve.  INR goal 1.8-2.2 if he has home monitoring given On-X valve   6.  Continue exercise routine  7   Require  prophylactic antibiotic therapy prior to dental cleaning, amoxicillin 2000 mg  8.  Screen for secondary causes of hypertension including hyperaldosterone levels.        History of Present Illness/Subjective    HPI: Kory Guerrero is a 64 year old male with history of aortic aneurysm and severe aortic insufficiency status post aortic valve replacement mechanical aortic valve complicated by cardiac arrest during hospitalization at [facility] who presents to cardiology clinic in follow-up.    Patient had an excellent recovery from his cardiothoracic surgery.  He denies any active chest pain or dyspnea on exertion.  He remains active and exercising.  Patient has concerns about side effects and intolerance to medications.  We spent extensive amount of time addressing each medication in detail.  We discussed options and alternatives to current medications.  He is currently on a low-sodium diet, exercising, weight is good.  Will screen for secondary causes.  He does have a adrenal mass which probably should be further evaluated and detected at [facility] but no workup was pursued.    Echocardiogram Results: [place]    2. Severe LV enlargement with mildly increased wall thickness. Calculated 3D LVEF 45%.(Mildly reduced function). Indeterminate diastolic function.     3. Abnormal septal wall motion related to prior surgery.     4. Normal RV size and systolic function.     5. s/p 25 mm On-X mechanical bileaflet aortic valve prosthesis (2/20/2024). Mean systolic gradient 8 mmHg. Mild prosthetic and periprosthetic regurgitation.     6. s/p aortic aneurysm repair with 28 mm Hemashield (2/20/2024).     7. The IVC measures <2.1 cm with >50% collapse upon inspiration consistent with normal right atrial pressure, 3 mmHg.     8. There is a trivial pericardial effusion.     9. Compared to the prior study from 02/27/2024 at [facility], there have been no significant changes.     Coronary Angiogram: 10/9/2023  1.  Normal-appearing  epicardial coronary arteries in a right dominant system.  2.  Aortic root and ascending aorta enlargement with moderate effacement of the sinotubular ridge.  3.  Elevated left ventricular end-diastolic pressure 27 mmHg  4.  Moderately severe to severe aortic regurgitation; no aortic stenosis.  Aortic valve leaflets are noncalcified and appear thin and mobile.  5.  Left ventricle visualized via aortic root angio.  LV appears mildly enlarged with systolic function at the lower limit of normal.       Physical Examination  Review of Systems   Vitals: /80 (BP Location: Right arm, Patient Position: Sitting, Cuff Size: Adult Regular)   Pulse 57   Resp 16   Ht 1.829 m (6')   Wt 82.5 kg (181 lb 12.8 oz)   SpO2 98%   BMI 24.66 kg/m    BMI= Body mass index is 24.66 kg/m .  Wt Readings from Last 3 Encounters:   11/06/24 82.5 kg (181 lb 12.8 oz)   06/11/24 83.3 kg (183 lb 11.2 oz)   04/03/24 82.6 kg (182 lb)        Pleasant   ENT/Mouth: membranes moist, no oral lesions or bleeding gums.      EYES:  no scleral icterus, normal conjunctivae       Chest/Lungs:   lungs are clear to auscultation   Cardiovascular:   Regular. Normal first and second heart sounds with mechanical click. No edema bilaterally    Abdomen:  no tenderness; bowel sounds are present   Extremities: no cyanosis or clubbing   Skin: no xanthelasma, warm.    Neurologic: normal  bilateral, no tremors     Psychiatric: alert and oriented x3, calm        Please refer above for cardiac ROS details.        Medical History  Surgical History Family History Social History   Past Medical History:   Diagnosis Date    Aortic root dilatation (H)     Aortic valve disorder 09/2023    Moderate to severe AI    Hypertension      Past Surgical History:   Procedure Laterality Date    CV AO ROOT N/A 10/9/2023    Procedure: Cardiac Aortic Root;  Surgeon: Edis Berrios MD;  Location: [facility] CATH LAB CV    CV CORONARY ANGIOGRAM N/A 10/9/2023    Procedure: Coronary  Angiogram;  Surgeon: Edis Berrios MD;  Location: [facility] CATH LAB CV    CV LEFT HEART CATH N/A 10/9/2023    Procedure: Left Heart Catheterization;  Surgeon: Edis Berrios MD;  Location: [facility] LAB CV    TONSILLECTOMY, ADENOIDECTOMY, COMBINED       Family History   Problem Relation Age of Onset    Cerebrovascular Disease Mother 75        ischemic stroke    Cerebrovascular Disease Father 75        hemorrhagic    Valvular heart disease Maternal Grandmother         Social History     Socioeconomic History    Marital status:      Spouse name: Not on file    Number of children: Not on file    Years of education: Not on file    Highest education level: Not on file   Occupational History    Not on file   Tobacco Use    Smoking status: Never    Smokeless tobacco: Never   Vaping Use    Vaping status: Never Used   Substance and Sexual Activity    Alcohol use: Yes     Comment: occasional    Drug use: Never    Sexual activity: Not on file   Other Topics Concern    Not on file   Social History Narrative    Not on file     Social Drivers of Health     Financial Resource Strain: Low Risk  (11/8/2023)    Received from [facility]    Overall Financial Resource Strain (CARDIA)     Difficulty of Paying Living Expenses: Not hard at all   Food Insecurity: No Food Insecurity (2/25/2024)    Received from [facility]    Hunger Vital Sign     Worried About Running Out of Food in the Last Year: Never true     Ran Out of Food in the Last Year: Never true   Transportation Needs: No Transportation Needs (2/25/2024)    Received from [facility]    PRAPARE - Transportation     Lack of Transportation (Medical): No     Lack of Transportation (Non-Medical): No   Physical Activity: Sufficiently Active (11/8/2023)    Received from [facility]    Exercise Vital Sign     Days of Exercise per Week: 7 days     Minutes of Exercise per Session: 70 min   Stress: Not on file   Social  "Connections: Not on file   Interpersonal Safety: Not At Risk (2/25/2024)    Received from [facility]    Humiliation, Afraid, Rape, and Kick questionnaire     Fear of Current or Ex-Partner: No     Emotionally Abused: No     Physically Abused: No     Sexually Abused: No   Housing Stability: Low Risk  (2/25/2024)    [facility]    Housing Stability     What is your living situation today?: I have a steady place to live           Medications  Allergies   Current Outpatient Medications   Medication Sig Dispense Refill    amoxicillin (AMOXIL) 500 MG capsule Take 2,000 mg (4 tabs) by mouth once, 30-60 minutes prior to dental work or cleaning. 4 capsule 0    aspirin (ASA) 81 MG chewable tablet Take 81 mg by mouth daily      bisoprolol (ZEBETA) 5 MG tablet Take 1 tablet (5 mg) by mouth daily. 30 tablet 3    hydroCHLOROthiazide (HYDRODIURIL) 25 MG tablet Take 1 tablet (25 mg) by mouth daily. 90 tablet 1    lisinopril (ZESTRIL) 40 MG tablet Take 1 tablet (40 mg) by mouth daily. 90 tablet 1    magnesium oxide 200 MG TABS Take 200 mg by mouth daily      multivitamin, therapeutic (THERA-VIT) TABS tablet Take 1 tablet by mouth daily      warfarin ANTICOAGULANT (COUMADIN) 4 MG tablet Take 8 mg by mouth daily.         Allergies   Allergen Reactions    Grass Pollen(K-O-R-T-Swt Gaurav) Other (See Comments)     other          Lab Results    Chemistry/lipid CBC Cardiac Enzymes/BNP/TSH/INR   Recent Labs   Lab Test 05/15/23  0914   TRIG 161     No results for input(s): \"LDL\" in the last 48419 hours.  Recent Labs   Lab Test 10/09/23  0752      POTASSIUM 3.6   CHLORIDE 104   CO2 25   *   BUN 13.2   CR 0.86   GFRESTIMATED >90   MARILUZ 8.7*     Recent Labs   Lab Test 10/09/23  0752   CR 0.86     No results for input(s): \"A1C\" in the last 90324 hours.    Last Comprehensive Metabolic Panel:  Lab Results   Component Value Date     10/09/2023    POTASSIUM 3.6 10/09/2023    CHLORIDE 104 " "10/09/2023    CO2 25 10/09/2023    ANIONGAP 11 10/09/2023     (H) 10/09/2023    BUN 13.2 10/09/2023    CR 0.86 10/09/2023    GFRESTIMATED >90 10/09/2023    MARILUZ 8.7 (L) 10/09/2023      Recent Labs   Lab Test 10/09/23  0752   WBC 4.3   HGB 14.9   HCT 42.2   MCV 89        Recent Labs   Lab Test 10/09/23  0752   HGB 14.9    No results for input(s): \"TROPONINI\" in the last 25252 hours.  No results for input(s): \"BNP\", \"NTBNPI\", \"NTBNP\" in the last 59213 hours.  No results for input(s): \"TSH\" in the last 81633 hours.  No results for input(s): \"INR\" in the last 78169 hours.       Jayden Mcnamara,       Today's clinic visit entailed:  42 minutes spent by me on the date of the encounter doing chart review, history and exam, documentation and further activities per the note    The longitudinal plan of care for the diagnosis(es)/condition(s) as documented were addressed during this visit. Due to the added complexity in care, I will continue to support Kory in the subsequent management and with ongoing continuity of care.                   "

## 2024-11-07 ENCOUNTER — APPOINTMENT (OUTPATIENT)
Dept: LAB | Facility: CLINIC | Age: 64
End: 2024-11-07
Payer: MEDICARE

## 2024-11-07 PROCEDURE — 84244 ASSAY OF RENIN: CPT | Mod: 90 | Performed by: INTERNAL MEDICINE

## 2024-11-07 PROCEDURE — 82088 ASSAY OF ALDOSTERONE: CPT | Performed by: INTERNAL MEDICINE

## 2024-11-07 PROCEDURE — 99000 SPECIMEN HANDLING OFFICE-LAB: CPT | Performed by: INTERNAL MEDICINE

## 2024-11-07 PROCEDURE — 36415 COLL VENOUS BLD VENIPUNCTURE: CPT | Performed by: INTERNAL MEDICINE

## 2024-11-08 LAB — ALDOST SERPL-MCNC: 10.7 NG/DL (ref 0–31)

## 2024-11-12 LAB
ALDOST/RENIN PLAS-RTO: 107 {RATIO} (ref 0–25)
RENIN PLAS-CCNC: 0.1 NG/ML/HR

## 2024-11-14 NOTE — RESULT ENCOUNTER NOTE
Patient's lab testing were abnormal.  Please inform the patient that lab testing is concerning for primary hyperaldosteronism.  This could be a cause for his unexplained high blood pressure.  Would recommend referral to endocrinology who specializes in these conditions and determine further workup.  If blood pressure remains elevated we could start him on spironolactone but endocrinology may request that he is off this medication for further testing.  Given he has an adrenal mass would be concerning that this could be the source for elevated aldosterone production.  I do feel it is very important that he sees an endocrinologist to further evaluate.    Joe

## 2024-11-15 DIAGNOSIS — E27.8 ADRENAL MASS (H): Primary | ICD-10-CM

## 2024-11-15 NOTE — PROGRESS NOTES
----- Message -----  From: Jayden Mcnamara DO  Sent: 11/14/2024  12:56 PM CST  To: [facility] Cv Rn Team Z    Patient's lab testing were abnormal.  Please inform the patient that lab testing is concerning for primary hyperaldosteronism.  This could be a cause for his unexplained high blood pressure.  Would recommend referral to endocrinology who specializes in these conditions and determine further workup.  If blood pressure remains elevated we could start him on spironolactone but endocrinology may request that he is off this medication for further testing.  Given he has an adrenal mass would be concerning that this could be the source for elevated aldosterone production.  I do feel it is very important that he sees an endocrinologist to further evaluate.    Joe             ==referral placed. -[facility]

## 2024-12-18 DIAGNOSIS — I10 BENIGN ESSENTIAL HYPERTENSION: ICD-10-CM

## 2024-12-19 RX ORDER — BISOPROLOL FUMARATE 5 MG/1
5 TABLET, FILM COATED ORAL DAILY
Qty: 90 TABLET | Refills: 1 | Status: SHIPPED | OUTPATIENT
Start: 2024-12-19

## 2025-01-08 ENCOUNTER — TELEPHONE (OUTPATIENT)
Dept: CARDIOLOGY | Facility: CLINIC | Age: 65
End: 2025-01-08
Payer: COMMERCIAL

## 2025-01-08 DIAGNOSIS — E27.8 ADRENAL MASS: ICD-10-CM

## 2025-01-08 DIAGNOSIS — I10 BENIGN ESSENTIAL HYPERTENSION: Primary | ICD-10-CM

## 2025-01-08 RX ORDER — SPIRONOLACTONE 25 MG/1
25 TABLET ORAL DAILY
Qty: 30 TABLET | Refills: 3 | Status: SHIPPED | OUTPATIENT
Start: 2025-01-08

## 2025-01-08 NOTE — TELEPHONE ENCOUNTER
===View-only below this line===  ----- Message -----  From: Jayden Mcnamara DO  Sent: 1/8/2025   2:46 PM CST  To: Orlando Cloud RN    Recommend checking in 2 weeks a renin level and basic metabolic panel.

## 2025-01-08 NOTE — TELEPHONE ENCOUNTER
PC to patient and review of response. Labs 2 weeks after medication, and at that time will review BP readings and report back to seek guidance from there. Verbalized understanding. Will present to outpatient lab when timed right. Pharmacy has and working on prescription. CMM,Rn

## 2025-01-08 NOTE — TELEPHONE ENCOUNTER
Dr. Mcnamara, please review. Patient will monitor his BP at home daily. Will add in Spironolactone. Any recommendations for follow-up? Or if further medication titrations? Thank you ZOE MORA   ____________________________________________  PC with patient and review. Discussion of recommendations from MAT. Agreeable to medication and will present for blood work in 2 weeks time. Orders placed. Rx sent to verified pharmacy location.     Patient is wondering if any of his medications will be adjusted or stopped. Pt reports that he easily gets sunburned while taking hydrochlorothiazide. Even with wearing sunscreen. Informed patient will update MAT. Pt reports that he will monitor/check his BP at home daily. Will return call once MAT responds. CMM,Rn

## 2025-01-08 NOTE — TELEPHONE ENCOUNTER
----- Message from Jayden Mcnamara sent at 1/7/2025  2:04 PM CST -----  Regarding: FW: MAT PATIENT  I spoke with patient's endocrinologist at [facility].  The recommendation is to start spironolactone 25 mg daily for hypertension.  Recommend checking in 2 weeks a renin level and basic metabolic panel.  ----- Message -----  From: Orlando Cloud RN  Sent: 1/3/2025   3:37 PM CST  To: Jayden Mcnamara DO  Subject: FW: MAT PATIENT                                  Hi Dr. Mcnamara  Can you please call and connect with Dr. Irby from [facility]? Thank you Nicole MORA  ----- Message -----  From: Florecita Villegas  Sent: 1/3/2025   3:15 PM CST  To: [facility] C[facility] Rn Team Z  Subject: MAT PATIENT                                      General phone call:    Caller: DR. SILVIO IRBY FROM [facility]    Primary cardiologist: JORGE    Detailed reason for call: DR. GARNETT WOULD LIKE A CALL BACK TO DISCUSS BP   MEDICATION MANAGEMENT.    Best phone number: [phone]    Best time to contact: ANY    Ok to leave a detailedmessage? YES    Device? NO    Additional Info:  PER DR. GARNETT, SOMETIMES HER PHONE WILL BLOCK UNKNOWN NUMBERS, IF THAT SHOULD OCCUR, PLEASE SEND A TEXT WITH A PHONE NUMBER SHE CAN REACH YOU AT. THANK YOU.

## 2025-01-19 ENCOUNTER — HEALTH MAINTENANCE LETTER (OUTPATIENT)
Age: 65
End: 2025-01-19

## 2025-01-24 ENCOUNTER — LAB (OUTPATIENT)
Dept: LAB | Facility: CLINIC | Age: 65
End: 2025-01-24
Payer: MEDICARE

## 2025-01-24 DIAGNOSIS — E27.8 ADRENAL MASS: ICD-10-CM

## 2025-01-24 DIAGNOSIS — I10 BENIGN ESSENTIAL HYPERTENSION: ICD-10-CM

## 2025-01-24 LAB
ANION GAP SERPL CALCULATED.3IONS-SCNC: 11 MMOL/L (ref 7–15)
BUN SERPL-MCNC: 22.1 MG/DL (ref 8–23)
CALCIUM SERPL-MCNC: 9.5 MG/DL (ref 8.8–10.4)
CHLORIDE SERPL-SCNC: 100 MMOL/L (ref 98–107)
CREAT SERPL-MCNC: 1.11 MG/DL (ref 0.67–1.17)
EGFRCR SERPLBLD CKD-EPI 2021: 73 ML/MIN/1.73M2
GLUCOSE SERPL-MCNC: 110 MG/DL (ref 70–99)
HCO3 SERPL-SCNC: 26 MMOL/L (ref 22–29)
POTASSIUM SERPL-SCNC: 4.8 MMOL/L (ref 3.4–5.3)
SODIUM SERPL-SCNC: 137 MMOL/L (ref 135–145)

## 2025-01-24 PROCEDURE — 84244 ASSAY OF RENIN: CPT

## 2025-01-24 PROCEDURE — 36415 COLL VENOUS BLD VENIPUNCTURE: CPT

## 2025-01-24 PROCEDURE — 82565 ASSAY OF CREATININE: CPT

## 2025-01-24 PROCEDURE — 80048 BASIC METABOLIC PNL TOTAL CA: CPT

## 2025-01-27 LAB — RENIN PLAS-CCNC: 0.6 NG/ML/HR

## 2025-01-29 ENCOUNTER — TELEPHONE (OUTPATIENT)
Dept: CARDIOLOGY | Facility: CLINIC | Age: 65
End: 2025-01-29
Payer: COMMERCIAL

## 2025-01-29 NOTE — TELEPHONE ENCOUNTER
----- Message from Jayden Mcnamara sent at 1/28/2025  3:57 PM CST -----  Recommend adjustment in medication after patient returns from [place].  No urgency to change medications.  Plan to increase once patient return then renin and BMP levels after 10 day of higher medication.  ----- Message -----  From: Olrando Cloud RN  Sent: 1/28/2025  12:16 PM CST  To: Jayden Mcnamara, DO Dr Cuevas please review. [place] 2/10-2/21. Please advise start Aldactone increase pre or post trip? If pre trip ok to check labs at 9-10 days vs 2 weeks? Thank you Zoe MORA  _________________________________________________    PC to patient and review of recs. Current BP readings; lowest 107/84, other readings 117/83, 114/82, average. Asymptomatic. Issue patient leaves for [place] 2/10-2/21 start increase in Aldactone pre-trip and check labs before trip (about 10 days after increase) or start increase upon return from travel? Will seek guidance from MAT and return call. ZOE MORA,   1/27/2025  2:53 PM CST       Patients potassium is now 4.8.  Renin has increase from 0.1 to 0.6.  I would recommend we increase aldactone to 37.5 mg daily recheck a renin (goal is 1.0) and BMP in 2 weeks.  Please inquire if blood pressure is improving.       ThanksJoe

## 2025-02-24 ENCOUNTER — MYC REFILL (OUTPATIENT)
Dept: CARDIOLOGY | Facility: CLINIC | Age: 65
End: 2025-02-24
Payer: COMMERCIAL

## 2025-02-24 DIAGNOSIS — Z95.2 H/O MECHANICAL AORTIC VALVE REPLACEMENT: ICD-10-CM

## 2025-02-24 DIAGNOSIS — I25.84 CORONARY ARTERY CALCIFICATION OF NATIVE ARTERY: ICD-10-CM

## 2025-02-24 DIAGNOSIS — I25.10 CORONARY ARTERY CALCIFICATION OF NATIVE ARTERY: ICD-10-CM

## 2025-02-24 RX ORDER — AMOXICILLIN 500 MG/1
CAPSULE ORAL
Qty: 4 CAPSULE | Refills: 1 | Status: SHIPPED | OUTPATIENT
Start: 2025-02-24

## 2025-03-10 ENCOUNTER — LAB (OUTPATIENT)
Dept: LAB | Facility: CLINIC | Age: 65
End: 2025-03-10
Payer: MEDICARE

## 2025-03-10 DIAGNOSIS — I10 BENIGN ESSENTIAL HYPERTENSION: ICD-10-CM

## 2025-03-10 DIAGNOSIS — E27.8 ADRENAL MASS: ICD-10-CM

## 2025-03-10 LAB
ANION GAP SERPL CALCULATED.3IONS-SCNC: 10 MMOL/L (ref 7–15)
BUN SERPL-MCNC: 25.8 MG/DL (ref 8–23)
CALCIUM SERPL-MCNC: 10.1 MG/DL (ref 8.8–10.4)
CHLORIDE SERPL-SCNC: 102 MMOL/L (ref 98–107)
CREAT SERPL-MCNC: 1.2 MG/DL (ref 0.67–1.17)
EGFRCR SERPLBLD CKD-EPI 2021: 67 ML/MIN/1.73M2
GLUCOSE SERPL-MCNC: 113 MG/DL (ref 70–99)
HCO3 SERPL-SCNC: 26 MMOL/L (ref 22–29)
POTASSIUM SERPL-SCNC: 5.3 MMOL/L (ref 3.4–5.3)
SODIUM SERPL-SCNC: 138 MMOL/L (ref 135–145)

## 2025-03-10 PROCEDURE — 82565 ASSAY OF CREATININE: CPT

## 2025-03-10 PROCEDURE — 84244 ASSAY OF RENIN: CPT

## 2025-03-10 PROCEDURE — 36415 COLL VENOUS BLD VENIPUNCTURE: CPT

## 2025-03-10 PROCEDURE — 80048 BASIC METABOLIC PNL TOTAL CA: CPT

## 2025-03-12 LAB — RENIN PLAS-CCNC: 2.4 NG/ML/HR

## 2025-03-24 ENCOUNTER — LAB (OUTPATIENT)
Dept: LAB | Facility: CLINIC | Age: 65
End: 2025-03-24
Payer: COMMERCIAL

## 2025-03-24 DIAGNOSIS — I10 BENIGN ESSENTIAL HYPERTENSION: ICD-10-CM

## 2025-03-24 DIAGNOSIS — Z95.2 H/O MECHANICAL AORTIC VALVE REPLACEMENT: ICD-10-CM

## 2025-03-24 DIAGNOSIS — E27.8 ADRENAL MASS: ICD-10-CM

## 2025-03-24 LAB
ANION GAP SERPL CALCULATED.3IONS-SCNC: 10 MMOL/L (ref 7–15)
BUN SERPL-MCNC: 23.3 MG/DL (ref 8–23)
CALCIUM SERPL-MCNC: 9.4 MG/DL (ref 8.8–10.4)
CHLORIDE SERPL-SCNC: 105 MMOL/L (ref 98–107)
CREAT SERPL-MCNC: 1.14 MG/DL (ref 0.67–1.17)
EGFRCR SERPLBLD CKD-EPI 2021: 71 ML/MIN/1.73M2
GLUCOSE SERPL-MCNC: 96 MG/DL (ref 70–99)
HCO3 SERPL-SCNC: 25 MMOL/L (ref 22–29)
POTASSIUM SERPL-SCNC: 4.5 MMOL/L (ref 3.4–5.3)
SODIUM SERPL-SCNC: 140 MMOL/L (ref 135–145)

## 2025-03-24 PROCEDURE — 80048 BASIC METABOLIC PNL TOTAL CA: CPT

## 2025-03-24 PROCEDURE — 36415 COLL VENOUS BLD VENIPUNCTURE: CPT

## 2025-03-24 RX ORDER — LOSARTAN POTASSIUM 50 MG/1
50 TABLET ORAL
Qty: 60 TABLET | Refills: 2 | Status: SHIPPED | OUTPATIENT
Start: 2025-03-24

## 2025-03-26 ENCOUNTER — TELEPHONE (OUTPATIENT)
Dept: CARDIOLOGY | Facility: CLINIC | Age: 65
End: 2025-03-26
Payer: COMMERCIAL

## 2025-03-26 DIAGNOSIS — Z95.2 H/O MECHANICAL AORTIC VALVE REPLACEMENT: ICD-10-CM

## 2025-03-26 DIAGNOSIS — I25.10 CORONARY ARTERY CALCIFICATION OF NATIVE ARTERY: ICD-10-CM

## 2025-03-26 DIAGNOSIS — I25.84 CORONARY ARTERY CALCIFICATION OF NATIVE ARTERY: ICD-10-CM

## 2025-03-26 DIAGNOSIS — I10 BENIGN ESSENTIAL HYPERTENSION: Primary | ICD-10-CM

## 2025-03-26 NOTE — TELEPHONE ENCOUNTER
----- Message from Jayden Mcnamara sent at 3/26/2025  8:01 AM CDT -----  Lab stable.  Can we determine how his blod and symptoms are without hydrochlorothiazide.  THX

## 2025-03-27 NOTE — TELEPHONE ENCOUNTER
Dr Cuevas please review patient update. Any further recs? Please recommend cardiology follow-up. Thank you CMM,Rn

## 2025-03-27 NOTE — TELEPHONE ENCOUNTER
===View-only below this line===  ----- Message -----  From: Jayden Mcnamara DO  Sent: 3/27/2025  10:15 AM CDT  To: Orlando Cloud RN    Continue with current medications.  Would recommend continuation of beta block given ascending aortic aneurysm.

## 2025-04-03 ENCOUNTER — MYC MEDICAL ADVICE (OUTPATIENT)
Dept: CARDIOLOGY | Facility: CLINIC | Age: 65
End: 2025-04-03
Payer: COMMERCIAL

## 2025-04-03 DIAGNOSIS — I10 BENIGN ESSENTIAL HYPERTENSION: Primary | ICD-10-CM

## 2025-04-07 RX ORDER — HYDROCHLOROTHIAZIDE 12.5 MG/1
12.5 TABLET ORAL DAILY
Qty: 90 TABLET | Refills: 1 | Status: SHIPPED | OUTPATIENT
Start: 2025-04-07

## 2025-04-07 NOTE — TELEPHONE ENCOUNTER
Spoke with pt regarding Dr. Mcnamara comments/recommendations. Pt verbalized understanding and is agreeable to plan. Rx sent in to preferred pharmacy for hydrochlorothiazide 12.5mg daily and order placed for BMP in 10 days. Pt will plan to use walk-in lab at . Encouraged him to keep a written BP log and provide update after blood draw next week. Pt agreeable and had no other needs at this time. aa    -------------------------------------------------------------------------------  Msg received:  From: Jayden Mcnamara DO  Sent: 4/7/2025   8:12 AM CDT  To: Denise Navarrete RN  Subject: FW: Blood pressure changes                       Please restart hydrochlorothiazide at 12.5 mg daily.  Check Bmp in 10 days.

## 2025-04-18 ENCOUNTER — TELEPHONE (OUTPATIENT)
Dept: CARDIOLOGY | Facility: CLINIC | Age: 65
End: 2025-04-18

## 2025-04-18 ENCOUNTER — LAB (OUTPATIENT)
Dept: LAB | Facility: CLINIC | Age: 65
End: 2025-04-18
Payer: MEDICARE

## 2025-04-18 DIAGNOSIS — I10 BENIGN ESSENTIAL HYPERTENSION: ICD-10-CM

## 2025-04-18 DIAGNOSIS — I10 BENIGN ESSENTIAL HYPERTENSION: Primary | ICD-10-CM

## 2025-04-18 DIAGNOSIS — E27.8 ADRENAL MASS: ICD-10-CM

## 2025-04-18 LAB
ANION GAP SERPL CALCULATED.3IONS-SCNC: 10 MMOL/L (ref 7–15)
BUN SERPL-MCNC: 25.7 MG/DL (ref 8–23)
CALCIUM SERPL-MCNC: 9.5 MG/DL (ref 8.8–10.4)
CHLORIDE SERPL-SCNC: 99 MMOL/L (ref 98–107)
CREAT SERPL-MCNC: 1.34 MG/DL (ref 0.67–1.17)
EGFRCR SERPLBLD CKD-EPI 2021: 58 ML/MIN/1.73M2
GLUCOSE SERPL-MCNC: 117 MG/DL (ref 70–99)
HCO3 SERPL-SCNC: 26 MMOL/L (ref 22–29)
POTASSIUM SERPL-SCNC: 5.5 MMOL/L (ref 3.4–5.3)
SODIUM SERPL-SCNC: 135 MMOL/L (ref 135–145)

## 2025-04-18 PROCEDURE — 80048 BASIC METABOLIC PNL TOTAL CA: CPT

## 2025-04-18 PROCEDURE — 36415 COLL VENOUS BLD VENIPUNCTURE: CPT

## 2025-04-18 PROCEDURE — 82310 ASSAY OF CALCIUM: CPT

## 2025-04-18 NOTE — TELEPHONE ENCOUNTER
----- Message from Jayden Mcnamara sent at 4/18/2025  1:48 PM CDT -----  Potassium was elevated with elevation in CR.  Need to reduce aldactone down to 25 mg daily.  Increase oral hydration. Aviod high potassium foods.  Repeat BMP in one week.

## 2025-04-21 RX ORDER — SPIRONOLACTONE 25 MG/1
25 TABLET ORAL DAILY
Qty: 90 TABLET | Refills: 1 | Status: SHIPPED | OUTPATIENT
Start: 2025-04-21

## 2025-04-21 NOTE — TELEPHONE ENCOUNTER
Late entry. PC with patient and review of lab results and recs. Medication reduced. BUN elevated encouraged an additional glass of water per day. Discussion of avoiding potassium rich foods. Options given. Will also research self online. No further questions. BMP order placed. MORGAN,RN

## 2025-04-28 ENCOUNTER — LAB (OUTPATIENT)
Dept: LAB | Facility: CLINIC | Age: 65
End: 2025-04-28
Payer: MEDICARE

## 2025-04-28 DIAGNOSIS — E27.8 ADRENAL MASS: ICD-10-CM

## 2025-04-28 DIAGNOSIS — I10 BENIGN ESSENTIAL HYPERTENSION: ICD-10-CM

## 2025-04-28 LAB
ANION GAP SERPL CALCULATED.3IONS-SCNC: 8 MMOL/L (ref 7–15)
BUN SERPL-MCNC: 25.1 MG/DL (ref 8–23)
CALCIUM SERPL-MCNC: 9.8 MG/DL (ref 8.8–10.4)
CHLORIDE SERPL-SCNC: 102 MMOL/L (ref 98–107)
CREAT SERPL-MCNC: 1.11 MG/DL (ref 0.67–1.17)
EGFRCR SERPLBLD CKD-EPI 2021: 73 ML/MIN/1.73M2
GLUCOSE SERPL-MCNC: 122 MG/DL (ref 70–99)
HCO3 SERPL-SCNC: 27 MMOL/L (ref 22–29)
POTASSIUM SERPL-SCNC: 5.1 MMOL/L (ref 3.4–5.3)
SODIUM SERPL-SCNC: 137 MMOL/L (ref 135–145)

## 2025-04-28 PROCEDURE — 80048 BASIC METABOLIC PNL TOTAL CA: CPT

## 2025-04-28 PROCEDURE — 36415 COLL VENOUS BLD VENIPUNCTURE: CPT

## 2025-05-19 DIAGNOSIS — E27.8 ADRENAL MASS: ICD-10-CM

## 2025-05-19 DIAGNOSIS — I10 BENIGN ESSENTIAL HYPERTENSION: ICD-10-CM

## 2025-05-20 RX ORDER — SPIRONOLACTONE 25 MG/1
25 TABLET ORAL DAILY
Qty: 90 TABLET | Refills: 0 | Status: SHIPPED | OUTPATIENT
Start: 2025-05-20

## 2025-05-20 NOTE — TELEPHONE ENCOUNTER
Jayden Mcnamara,       4/18/25  1:48 PM  Result Note  Potassium was elevated with elevation in CR.  Need to reduce aldactone down to 25 mg daily.  Increase oral hydration. Aviod high potassium foods.  Repeat BMP in one week

## 2025-06-18 DIAGNOSIS — I10 BENIGN ESSENTIAL HYPERTENSION: ICD-10-CM

## 2025-06-18 DIAGNOSIS — E27.8 ADRENAL MASS: ICD-10-CM

## 2025-06-18 DIAGNOSIS — Z95.2 H/O MECHANICAL AORTIC VALVE REPLACEMENT: ICD-10-CM

## 2025-06-18 RX ORDER — SPIRONOLACTONE 25 MG/1
25 TABLET ORAL DAILY
Qty: 90 TABLET | Refills: 0 | Status: SHIPPED | OUTPATIENT
Start: 2025-06-18

## 2025-06-18 RX ORDER — BISOPROLOL FUMARATE 5 MG/1
5 TABLET, FILM COATED ORAL DAILY
Qty: 90 TABLET | Refills: 0 | Status: SHIPPED | OUTPATIENT
Start: 2025-06-18

## 2025-06-18 RX ORDER — LOSARTAN POTASSIUM 50 MG/1
50 TABLET ORAL
Qty: 180 TABLET | Refills: 0 | Status: SHIPPED | OUTPATIENT
Start: 2025-06-18

## 2025-08-10 DIAGNOSIS — Z95.2 H/O MECHANICAL AORTIC VALVE REPLACEMENT: ICD-10-CM

## 2025-08-10 DIAGNOSIS — E27.8 ADRENAL MASS: ICD-10-CM

## 2025-08-11 RX ORDER — LOSARTAN POTASSIUM 50 MG/1
50 TABLET ORAL
Qty: 180 TABLET | Refills: 0 | OUTPATIENT
Start: 2025-08-11

## (undated) DEVICE — CATH ANGIO INFINITI AL1 4FRX100CM 538445

## (undated) DEVICE — CUSTOM PACK CORONARY SAN5BCRHEA

## (undated) DEVICE — HEMOSTAT COMPRESSION VASC REGULAR OD24 CM 3524

## (undated) DEVICE — CATH ANGIO INFINITI PIGTAIL 155 6 SH 6FRX110CM 534654S

## (undated) DEVICE — CATH DIAG RADIAL 5FR TIG 4.5 100CM

## (undated) DEVICE — SHTH INTRO 0.021IN ID 6FR DIA

## (undated) DEVICE — EXCHANGE WIRE .035 260 STAR/JFC/035/260/ M001491681

## (undated) DEVICE — KIT HAND CONTROL ACIST 014644 AR-P54

## (undated) DEVICE — ELECTRODE DEFIB CADENCE 22550R

## (undated) DEVICE — CATH ANGIO IMPULSE 6FR 100CML RIGHT 5

## (undated) DEVICE — SYR ANGIOGRAPHY MULTIUSE KIT ACIST 014612

## (undated) DEVICE — MANIFOLD KIT ANGIO AUTOMATED 014613

## (undated) DEVICE — CATH ANGIO JUDKINS JL5 6FRX100CM INFINITI 534622T

## (undated) RX ORDER — ASPIRIN 325 MG
TABLET ORAL
Status: DISPENSED
Start: 2023-10-09

## (undated) RX ORDER — FENTANYL CITRATE 50 UG/ML
INJECTION, SOLUTION INTRAMUSCULAR; INTRAVENOUS
Status: DISPENSED
Start: 2023-10-09

## (undated) RX ORDER — DIAZEPAM 5 MG
TABLET ORAL
Status: DISPENSED
Start: 2023-10-09

## (undated) RX ORDER — NICARDIPINE HCL-0.9% SOD CHLOR 1 MG/10 ML
SYRINGE (ML) INTRAVENOUS
Status: DISPENSED
Start: 2023-10-09